# Patient Record
Sex: FEMALE | Race: WHITE | Employment: OTHER | URBAN - METROPOLITAN AREA
[De-identification: names, ages, dates, MRNs, and addresses within clinical notes are randomized per-mention and may not be internally consistent; named-entity substitution may affect disease eponyms.]

---

## 2018-01-22 ENCOUNTER — OFFICE VISIT (OUTPATIENT)
Dept: ORTHOPEDIC SURGERY | Age: 58
End: 2018-01-22

## 2018-01-22 VITALS
WEIGHT: 240 LBS | BODY MASS INDEX: 42.52 KG/M2 | HEIGHT: 63 IN | SYSTOLIC BLOOD PRESSURE: 147 MMHG | HEART RATE: 93 BPM | DIASTOLIC BLOOD PRESSURE: 89 MMHG

## 2018-01-22 DIAGNOSIS — M25.521 RIGHT ELBOW PAIN: ICD-10-CM

## 2018-01-22 DIAGNOSIS — M25.511 RIGHT SHOULDER PAIN, UNSPECIFIED CHRONICITY: Primary | ICD-10-CM

## 2018-01-22 PROCEDURE — 99204 OFFICE O/P NEW MOD 45 MIN: CPT | Performed by: ORTHOPAEDIC SURGERY

## 2018-01-22 ASSESSMENT — ENCOUNTER SYMPTOMS
SHORTNESS OF BREATH: 1
BLOOD IN STOOL: 1
BACK PAIN: 1

## 2018-01-22 NOTE — PROGRESS NOTES
space.    Moderate purchase patient has a rotator cuff tear. She's had inadequate CT scans up to now to evaluate this. She cannot have an MRI because of the spinal cord stimulator. We will obtain a CT arthrogram of her shoulder to evaluate her rotator cuff and she'll be seen after completion of study. Greater than 50% of the visit was spent counseling the patient. I personally reviewed the patient's pain scale, review of systems, family history, social history, past medical history, allergies and medications. 13 point review of systems was collected today and is included in the medical record. Vj Espana MD  Sports Medicine, Knee and Shoulder Surgery    This dictation was performed with a verbal recognition program [facility]) and it was checked for errors. It is possible that there are still dictated errors within this office note. If so, please bring any errors to my attention for an addendum. All efforts were made to ensure that this office note is accurate.

## 2018-01-29 ENCOUNTER — OFFICE VISIT (OUTPATIENT)
Dept: ORTHOPEDIC SURGERY | Age: 58
End: 2018-01-29

## 2018-01-29 VITALS
SYSTOLIC BLOOD PRESSURE: 130 MMHG | DIASTOLIC BLOOD PRESSURE: 85 MMHG | BODY MASS INDEX: 42.52 KG/M2 | HEART RATE: 85 BPM | HEIGHT: 63 IN | WEIGHT: 240 LBS

## 2018-01-29 DIAGNOSIS — S46.811A PARTIAL TEAR OF RIGHT SUBSCAPULARIS TENDON, INITIAL ENCOUNTER: Primary | ICD-10-CM

## 2018-01-29 DIAGNOSIS — M19.019 AC (ACROMIOCLAVICULAR) JOINT ARTHRITIS: ICD-10-CM

## 2018-01-29 PROCEDURE — 1036F TOBACCO NON-USER: CPT | Performed by: ORTHOPAEDIC SURGERY

## 2018-01-29 PROCEDURE — G8428 CUR MEDS NOT DOCUMENT: HCPCS | Performed by: ORTHOPAEDIC SURGERY

## 2018-01-29 PROCEDURE — 3014F SCREEN MAMMO DOC REV: CPT | Performed by: ORTHOPAEDIC SURGERY

## 2018-01-29 PROCEDURE — 3017F COLORECTAL CA SCREEN DOC REV: CPT | Performed by: ORTHOPAEDIC SURGERY

## 2018-01-29 PROCEDURE — G8417 CALC BMI ABV UP PARAM F/U: HCPCS | Performed by: ORTHOPAEDIC SURGERY

## 2018-01-29 PROCEDURE — 99214 OFFICE O/P EST MOD 30 MIN: CPT | Performed by: ORTHOPAEDIC SURGERY

## 2018-01-29 PROCEDURE — G8484 FLU IMMUNIZE NO ADMIN: HCPCS | Performed by: ORTHOPAEDIC SURGERY

## 2018-01-29 ASSESSMENT — ENCOUNTER SYMPTOMS
SHORTNESS OF BREATH: 1
BACK PAIN: 1
BLOOD IN STOOL: 1

## 2018-01-29 NOTE — PROGRESS NOTES
Review of Systems   Respiratory: Positive for shortness of breath. Asthma   Cardiovascular:        Hypertension   Gastrointestinal: Positive for blood in stool. Ulcer     Genitourinary:        Incontinence   Musculoskeletal: Positive for back pain, joint pain and neck pain.         R arm pain    Neurological:        Neuropathy  Fibromyalgia  Chronic pain    Psychiatric/Behavioral:        Anxiety

## 2018-01-29 NOTE — PROGRESS NOTES
Chief complaint is right shoulder pain. Patient comes in following her CT arthrogram which showed she had a significant partial-thickness subscapularis tear. No evidence of communicating super status or infraspinatus tear was present. She continues to have pain which is present primarily of the anterior aspect of her shoulder and worse with lifting and with using her arm out to the side. Pain Assessment  Location of Pain: Shoulder  Location Modifiers: Right  Severity of Pain: 8  Quality of Pain: Sharp, Aching  Duration of Pain: Persistent  Frequency of Pain: Constant  Aggravating Factors: Bending (reaching / lifting )  Limiting Behavior: Yes  Relieving Factors: Rest  Result of Injury: No  Work-Related Injury: No  Are there other pain locations you wish to document?: No    Past Medical History:   Diagnosis Date    Anxiety     Arthritis     knees, shoulders    Asthma     Bladder spasms     has spinal cord stimulator    COPD (chronic obstructive pulmonary disease) (HCC)     Depression     Diabetes mellitus (HCC)     Fibromyalgia     Hyperlipidemia     Hypertension     Migraine     MVP (mitral valve prolapse)     RSD (reflex sympathetic dystrophy)     Sleep apnea     bipap 17/13  is in car        Past Surgical History:   Procedure Laterality Date    ANKLE ARTHROSCOPY  1993 &1994    left    ANKLE SURGERY  1994    reconstruction and ligament repair    APPENDECTOMY  1987    BLADDER SURGERY  2005    collegen injection    BLADDER SUSPENSION  2003    TVT    CARDIAC CATHETERIZATION  2005    no blockage    CARPAL TUNNEL RELEASE  1988    right    CHOLECYSTECTOMY  1996    w/follow up for necrotic tissue 1 wk later    FRACTURE SURGERY  1967    left forearm   [place].  w/abd. adhesions repair    HYSTERECTOMY  1992    w/right ooperectomy    JOINT REPLACEMENT  2012 knee right    KNEE ARTHROPLASTY  06/12/2012    RIGHT TOTAL KNEE REPLACEMENT              KNEE ARTHROSCOPY  2000 (MS CONTIN) 15 MG CR tablet Take 1 tablet by mouth daily DONT FILL UNTIL 9/8 30 tablet 0    baclofen (LIORESAL) 10 MG tablet Take 1 tablet by mouth 2 times daily 60 tablet 5    morphine (MSIR) 15 MG tablet Take 1 tablet by mouth daily 30 tablet 0    morphine (MSIR) 15 MG tablet Take 1 tablet by mouth daily Fill after 1/1/16 30 tablet 0    oxyCODONE-acetaminophen (PERCOCET) 5-325 MG per tablet Take 1 tablet by mouth 3 times daily Fill after 1/1/16 90 tablet 0    oxyCODONE-acetaminophen (PERCOCET) 5-325 MG per tablet Take 1 tablet by mouth 3 times daily 90 tablet 0    oxyCODONE-acetaminophen (PERCOCET) 5-325 MG per tablet Take 1 tablet by mouth 3 times daily as needed for Pain. 90 tablet 0    oxyCODONE-acetaminophen (PERCOCET) 7.5-325 MG per tablet Take 2 tablets by mouth every 4 hours as needed for Pain (May take one or two tablets as needed for pain) for 7 days. 60 tablet 0    aspirin (TOYIN ASPIRIN) 325 MG tablet Take 1 tablet by mouth 2 times daily for 30 days. Take the aspirin twice per day with meals. The medication may be stopped after four weeks. 100 tablet 0    SitaGLIPtin-MetFORMIN HCl (JANUMET XR)  MG TB24 Take 1 tablet by mouth daily.  oxyCODONE-acetaminophen (PERCOCET)  MG per tablet Take 60 tablets by mouth every 4 hours as needed for Pain (may take 1/2 or one tablet as needed for pain.) for 7 days. 60 tablet 0    Mometasone Furoate (NASONEX NA) 2 sprays by Nasal route 2 times daily. 2 sprays each nare bid       Roflumilast (DALIRESP) 500 MCG tablet Take 500 mcg by mouth daily.  azelastine (ASTELIN) 137 MCG/SPRAY nasal spray 2 sprays by Nasal route 2 times daily. 2 sprays each nare bid       budesonide-formoterol (SYMBICORT) 160-4.5 MCG/ACT AERO Inhale 2 puffs into the lungs 2 times daily.  Levalbuterol HCl (XOPENEX IN) Inhale 2 puffs into the lungs 2 times daily.  cetirizine (ZYRTEC) 10 MG tablet Take 10 mg by mouth daily as needed.       simvastatin

## 2018-02-06 ENCOUNTER — EVALUATION (OUTPATIENT)
Dept: PHYSICAL THERAPY | Age: 58
End: 2018-02-06

## 2018-02-06 DIAGNOSIS — M25.511 ACUTE PAIN OF RIGHT SHOULDER: Primary | ICD-10-CM

## 2018-02-06 DIAGNOSIS — M75.111 INCOMPLETE TEAR OF RIGHT ROTATOR CUFF: ICD-10-CM

## 2018-02-06 PROCEDURE — 1101F PT FALLS ASSESS-DOCD LE1/YR: CPT | Performed by: PHYSICAL THERAPIST

## 2018-02-06 PROCEDURE — 97530 THERAPEUTIC ACTIVITIES: CPT | Performed by: PHYSICAL THERAPIST

## 2018-02-06 PROCEDURE — G8539 DOC FUNCT AND CARE PLAN: HCPCS | Performed by: PHYSICAL THERAPIST

## 2018-02-06 PROCEDURE — G8427 DOCREV CUR MEDS BY ELIG CLIN: HCPCS | Performed by: PHYSICAL THERAPIST

## 2018-02-06 PROCEDURE — G8730 PAIN DOC POS AND PLAN: HCPCS | Performed by: PHYSICAL THERAPIST

## 2018-02-06 PROCEDURE — 97110 THERAPEUTIC EXERCISES: CPT | Performed by: PHYSICAL THERAPIST

## 2018-02-06 PROCEDURE — G8984 CARRY CURRENT STATUS: HCPCS | Performed by: PHYSICAL THERAPIST

## 2018-02-06 PROCEDURE — 97162 PT EVAL MOD COMPLEX 30 MIN: CPT | Performed by: PHYSICAL THERAPIST

## 2018-02-06 PROCEDURE — G8985 CARRY GOAL STATUS: HCPCS | Performed by: PHYSICAL THERAPIST

## 2018-02-06 NOTE — FLOWSHEET NOTE
[facility]   Phone: [phone]    Fax: [phone]        Physical Therapy Daily Treatment Note  Date:  2018    Patient Name:  Yvonne De La Torre    :  1960  MRN: [id number]  Medical/Treatment Diagnosis Information:  Diagnosis: Right shoulder partial subscapularis tear     Insurance/Certification information:  PT Insurance Information: Medicare  Physician Information:  Referring Practitioner: Dr Suman Chappell of care signed (Y/N):     Date of Patient follow up with Physician: 18    G-Code (if applicable):      Date G-Code Applied:  18  PT G-Codes  Functional Assessment Tool Used: UEFI  Score: 32/80=40%   (60% FUNCTIONAL LIMITATION)  Functional Limitation: Carrying, moving and handling objects  Carrying, Moving and Handling Objects Current Status (): At least 60 percent but less than 80 percent impaired, limited or restricted  Carrying, Moving and Handling Objects Goal Status (): At least 1 percent but less than 20 percent impaired, limited or restricted    PQRS:  18  Reviewed medication list with patient. No changes since last PT visit.         Progress Note: [x]  Yes  []  No  Next due by: Visit #10      Latex Allergy:  []NO      [x]YES  Preferred Language for Healthcare:   [x]English       []other:    Visit # Insurance Allowable   1 20     Pain level:  0-6/10     SUBJECTIVE:  See eval    OBJECTIVE:     ROM PROM AROM  Comment    L R L R 18   Flexion   160° 130° seated   Abduction   160° 135° seated   ER @90   90° 85° supine   IR @ 90   60° 55° supine   Other        Other             Strength L R Comment   Flexion 4+/5 3+/5* 18   Abduction 4+/5 3+/5*    ER 4+/5 4/5    IR 5/5 4/5*    Supraspinatus      Upper Trap      Lower Trap      Mid Trap      Rhomboids      Biceps      Triceps      Horizontal Abduction      Horizontal Adduction      Lats          Special Tests  18 Results/Comment   Darnell Bhatti    Speeds (+)

## 2018-02-06 NOTE — PLAN OF CARE
[facility]   Phone: [phone]    Fax: [phone]        Physical Therapy Certification    Dear Referring Practitioner: Dr Viet Garcia,    We had the pleasure of evaluating the following patient for physical therapy services at [address] [place] [address]. A summary of our findings can be found in the initial assessment below. This includes our plan of care. If you have any questions or concerns regarding these findings, please do not hesitate to contact me at the office phone number checked above. Thank you for the referral.       Physician Signature:_______________________________Date:__________________  By signing above (or electronic signature), therapists plan is approved by physician      Patient: Jaycee Avila   : 1960   MRN: [id number]  Referring Physician: Referring Practitioner: Dr Viet Garcia      Evaluation Date: 2018      Medical Diagnosis Information:  Diagnosis: Right shoulder partial subscapularis tear                                             Insurance information: PT Insurance Information: Medicare    Precautions/ Contra-indications: Has a spinal stimulater  Latex Allergy:  []NO      [x]YES  Preferred Language for Healthcare:   [x]English       []other:    SUBJECTIVE:   She reports that she felt like she fully recoverd from her previous RTC repairs in both shoulders. In 2017, she was on vacation, repeatedly pulling herself up into a tall truck with her right arm. Major increase in anterior shoulder pain and it feels like it is a \"tight band\" around her upper arm. She originally saw an MD from [facility]. Received a cortisone shot and was given some exercises to do at home. Reported no relief with the injection and the exercises seemed to make it worse. She is already taking Advil. She got in to see Dr Viet Garcia in January. He ordered a CT arthrogram which showed a partial thickness tear of her subscap.    Wants OH/ER  Left:   T4                   Right:  T2*  BB/IR   Left:   T8                   Right:  L1-2*       Reflexes/Sensation:    [x]Dermatomes/Myotomes intact    []Reflexes equal and normal bilaterally   []Other:    Joint mobility:    []Normal    [x]Hypo   []Hyper    Palpation: marked TTP LHBT    Functional Mobility/Transfers: independent with basic transfers    Posture:  moderate forward shoulder/head    Bandages/Dressings/Incisions: NA    Gait: (include devices/WB status): WNL    Orthopedic Special Tests: see above                       [x] Patient history, allergies, meds reviewed. Medical chart reviewed. See intake form. Review Of Systems (ROS):  [x]Performed Review of systems (Integumentary, CardioPulmonary, Neurological) by intake and observation. Intake form has been scanned into medical record. Patient has been instructed to contact their primary care physician regarding ROS issues if not already being addressed at this time.       Co-morbidities/Complexities (which will affect course of rehabilitation):   []None           Arthritic conditions   []Rheumatoid arthritis (M05.9)  [x]Osteoarthritis (M19.91)   Cardiovascular conditions   [x]Hypertension (I10)  []Hyperlipidemia (E78.5)  []Angina pectoris (I20)  []Atherosclerosis (I70)   Musculoskeletal conditions   []Disc pathology   []Congenital spine pathologies   [x]Prior surgical intervention  []Osteoporosis (M81.8)  []Osteopenia (M85.8)   Endocrine conditions   []Hypothyroid (E03.9)  []Hyperthyroid Gastrointestinal conditions   []Constipation (C61.59)   Metabolic conditions   []Morbid obesity (E66.01)  [x]Diabetes type 1(E10.65) or 2 (E11.65)   [x]Neuropathy (G60.9)     Pulmonary conditions   []Asthma (J45)  []Coughing   []COPD (J44.9)   Psychological Disorders  [x]Anxiety (F41.9)  []Depression (F32.9)   []Other:   []Other:          Barriers to/and or personal factors that will affect rehab potential:              []Age  []Sex []Motivation/Lack of Motivation                        [x]Co-Morbidities              []Cognitive Function, education/learning barriers              []Environmental, home barriers              []profession/work barriers  [x]past PT/medical experience  []other:  Justification: Significant medical history and co morbidities     Falls Risk Assessment (30 days):   [x] Falls Risk assessed and no intervention required. [] Falls Risk assessed and Patient requires intervention due to being higher risk   TUG score (>12s at risk):     [] Falls education provided, including       G-Codes:  PT G-Codes  Functional Assessment Tool Used: UEFI  Score: 32/80=40%   (60% FUNCTIONAL LIMITATION)  Functional Limitation: Carrying, moving and handling objects  Carrying, Moving and Handling Objects Current Status (): At least 60 percent but less than 80 percent impaired, limited or restricted  Carrying, Moving and Handling Objects Goal Status (): At least 1 percent but less than 20 percent impaired, limited or restricted        Pain  [x]  Pain diagram was completed, pain was present and a follow up plan to address the pain is in the plan of care. ()   []  Pain diagram was completed and there was no pain present and therefore no follow up plan to address pain in the plan of care. ()   []  Pain diagram was not completed and the reason for not completing the pain diagram is in the medical chart ()  []  Patient refused to participate   []  Severe mental or physical capacity, patient is in urgent emergent situation and to complete the questionnaire would jeopardize the patient's health status        Functional Questionnaire  [x]  Patient completed the functional outcome questionnaire and deficiencies were addressed in the plan of care. ()   []  Patient completed the outcome questionnaire and there were no functional deficits identified and therefore no plan of care is required.  ()   []  Patient did not Term Goals: To be achieved in: 4 weeks  1. Disability index score of 30% or less for the DASH to assist with reaching prior level of function. 2. Patient will demonstrate increased AROM to 90% of uninvolved to allow for proper joint functioning as indicated by patients Functional Deficits. 3. Patient will demonstrate an increase in Strength by 1/2 grade throughout to allow for proper functional mobility as indicated by patients Functional Deficits. 4. Patient will return to prior basic/low level functional activities without increased symptoms or restriction.    5. Able to sleep and drive comfortably without compensation     Electronically signed by:      Chichi Teague, [address] #[id number]

## 2018-02-13 ENCOUNTER — TREATMENT (OUTPATIENT)
Dept: PHYSICAL THERAPY | Age: 58
End: 2018-02-13

## 2018-02-13 DIAGNOSIS — M25.511 ACUTE PAIN OF RIGHT SHOULDER: Primary | ICD-10-CM

## 2018-02-13 DIAGNOSIS — M75.111 INCOMPLETE TEAR OF RIGHT ROTATOR CUFF: ICD-10-CM

## 2018-02-13 PROCEDURE — 97110 THERAPEUTIC EXERCISES: CPT | Performed by: PHYSICAL THERAPIST

## 2018-02-13 PROCEDURE — G8427 DOCREV CUR MEDS BY ELIG CLIN: HCPCS | Performed by: PHYSICAL THERAPIST

## 2018-02-13 PROCEDURE — 97530 THERAPEUTIC ACTIVITIES: CPT | Performed by: PHYSICAL THERAPIST

## 2018-02-13 NOTE — FLOWSHEET NOTE
[facility]   Phone: [phone]    Fax: [phone]        Physical Therapy Daily Treatment Note  Date:  2018    Patient Name:  Trixie Coleman    :  1960  MRN: [id number]  Medical/Treatment Diagnosis Information:  Diagnosis: Right shoulder partial subscapularis tear     Insurance/Certification information:  PT Insurance Information: Medicare  Physician Information:  Referring Practitioner: Dr Sukhi Stevens of care signed (Y/N):     Date of Patient follow up with Physician: 18    G-Code (if applicable):      Date G-Code Applied:  18  PT G-Codes  Functional Assessment Tool Used: UEFI  Score: 32/80=40%   (60% FUNCTIONAL LIMITATION)  Functional Limitation: Carrying, moving and handling objects  Carrying, Moving and Handling Objects Current Status (): At least 60 percent but less than 80 percent impaired, limited or restricted  Carrying, Moving and Handling Objects Goal Status (): At least 1 percent but less than 20 percent impaired, limited or restricted    PQRS:  18  Reviewed medication list with patient. No changes since last PT visit. Progress Note: []  Yes  []  No  Next due by: Visit #10      Latex Allergy:  []NO      [x]YES  Preferred Language for Healthcare:   [x]English       []other:    Visit # Insurance Allowable   2 20     Pain level:  6/10   2/13/18     SUBJECTIVE:    She reports that her shoulder has been fairly sore over the past few days. Has been doing her HEP and also found her old pulley set at home.            OBJECTIVE:     ROM PROM AROM  Comment    L R L R 18   Flexion   160° 130° seated   Abduction   160° 135° seated   ER @90   90° 85° supine   IR @ 90   60° 55° supine   Other        Other             Strength L R Comment   Flexion 4+/5 3+/5* 18   Abduction 4+/5 3+/5*    ER 4+/5 4/5    IR 5/5 4/5*    Supraspinatus      Upper Trap      Lower Trap      Mid Trap      Rhomboids      Biceps      Triceps Horizontal Abduction      Horizontal Adduction      Lats          Special Tests  2/6/18 Results/Comment   RoxannaShellyBjorn Bhatti    Speeds (+)   O[place]riens    Belly Press Able to hold by (+) for pain   Neurologic Signs    Functional Reach OH/ER  Left:   T4                   Right:  T2*  BB/IR   Left:   T8                   Right:  L1-2*           RESTRICTIONS/PRECAUTIONS:  She has a spinal stimulater    Exercises/Interventions:     Exercise/Equipment Resistance/Repetitions Comments   Cardio/Warm-up     Bike          Stretching/PROM     Wand Supine flexion 5\"x10  Standing abduction 5\"x10    Wall Slides Flexion 5\"x10  npv   UE [place] Standing flexion 5\"x15 [place] low on incline   Pulleys BTD x5'    Pendulum           STRENGTH     Isometrics     Retraction          Weight shift     Flexion     Abduction     External Rotation Towel into wall 5\"x15 submax   Internal Rotation Towel into wall 5\"x15 submax   Biceps     Triceps          PRE's     Flexion     Abduction     External Rotation     Internal Rotation     Shrugs     EXT     Reverse Flys     Serratus     Horizontal Abd with ER     Biceps     Triceps     Retraction          Cable Column/Theraband     External Rotation     Internal Rotation     Shrugs     Lats     Ext     Flex     Scapular Retraction     BIC     TRIC     PNF          Neuromuscular Re-ed     Dynamic Stability Supine/Inclined AROM Flex/Ext,  CW/CCW x30                             [place]                    Manual/Modalities                           Therapeutic Exercise and NMR EXR  [x] (71864) Provided verbal/tactile cueing for activities related to strengthening, flexibility, endurance, ROM  for improvements in scapular, scapulothoracic and UE control with self care, reaching, carrying, lifting, house/yardwork, driving/computer work.    [] (71468) Provided verbal/tactile cueing for activities related to improving balance, coordination, kinesthetic sense, posture, motor skill, proprioception

## 2018-02-21 ENCOUNTER — TREATMENT (OUTPATIENT)
Dept: PHYSICAL THERAPY | Age: 58
End: 2018-02-21

## 2018-02-21 DIAGNOSIS — M75.111 INCOMPLETE TEAR OF RIGHT ROTATOR CUFF: ICD-10-CM

## 2018-02-21 DIAGNOSIS — M25.511 ACUTE PAIN OF RIGHT SHOULDER: Primary | ICD-10-CM

## 2018-02-21 PROCEDURE — 97110 THERAPEUTIC EXERCISES: CPT | Performed by: PHYSICAL THERAPIST

## 2018-02-21 PROCEDURE — G8427 DOCREV CUR MEDS BY ELIG CLIN: HCPCS | Performed by: PHYSICAL THERAPIST

## 2018-02-21 PROCEDURE — 97530 THERAPEUTIC ACTIVITIES: CPT | Performed by: PHYSICAL THERAPIST

## 2018-02-21 NOTE — FLOWSHEET NOTE
balance, coordination, kinesthetic sense, posture, motor skill, proprioception  to assist with  scapular, scapulothoracic and UE control with self care, reaching, carrying, lifting, house/yardwork, driving/computer work. Therapeutic Activities:    [x] (40750 or 31735) Provided verbal/tactile cueing for activities related to improving balance, coordination, kinesthetic sense, posture, motor skill, proprioception and motor activation to allow for proper function of scapular, scapulothoracic and UE control with self care, carrying, lifting, driving/computer work. 2/6/18  Spent time discussing with patient here precautions and limitations. Needs to avoid any heavy lifting, but ok to use her arm within comfortable ranges. Also talked about getting a set of home pulleys. She said she may still have some from her previous surgeries. I also provided her information on where she can buy a set online.     Home Exercise Program:    [x] (64808) Reviewed/Progressed HEP activities related to strengthening, flexibility, endurance, ROM of scapular, scapulothoracic and UE control with self care, reaching, carrying, lifting, house/yardwork, driving/computer work  [] (06189) Reviewed/Progressed HEP activities related to improving balance, coordination, kinesthetic sense, posture, motor skill, proprioception of scapular, scapulothoracic and UE control with self care, reaching, carrying, lifting, house/yardwork, driving/computer work      Manual Treatments:  PROM / STM / Oscillations-Mobs:  G-I, II, III, IV (PA's, Inf., Post.)  [] (53362) Provided manual therapy to mobilize soft tissue/joints of cervical/CT, scapular GHJ and UE for the purpose of modulating pain, promoting relaxation,  increasing ROM, reducing/eliminating soft tissue swelling/inflammation/restriction, improving soft tissue extensibility and allowing for proper ROM for normal function with self care, reaching, carrying, lifting, house/yardwork, weaker than IR. She is also maintaining good PROM. Working on increasing control with her AAROM and AROM especially in positions of 90 and overhead. She demonstrates good control with UE at 90 in gravity eliminated positions (supine).       Treatment/Activity Tolerance:  [x] Patient tolerated treatment well with some increase in pain [] Patient limited by fatique  [x] Patient limited by pain  [] Patient limited by other medical complications  [] Other:     Prognosis: [] Good [x] Fair with conservative treatment [] Poor    Patient Requires Follow-up: [x] Yes  [] No    PLAN: See eval.  Cont 1-2 x/week for 3-4 weeks for ROM restoration, strengthening within painfree ranges  [x] Continue per plan of care [] Alter current plan (see comments)  [] Plan of care initiated [] Hold pending MD visit [] Discharge    Electronically signed by:     Zainab Cabral PT #[id number]

## 2018-02-26 ENCOUNTER — OFFICE VISIT (OUTPATIENT)
Dept: ORTHOPEDIC SURGERY | Age: 58
End: 2018-02-26

## 2018-02-26 VITALS
WEIGHT: 237 LBS | HEIGHT: 63 IN | SYSTOLIC BLOOD PRESSURE: 141 MMHG | HEART RATE: 95 BPM | BODY MASS INDEX: 41.99 KG/M2 | DIASTOLIC BLOOD PRESSURE: 86 MMHG

## 2018-02-26 DIAGNOSIS — M75.101 TEAR OF RIGHT ROTATOR CUFF, UNSPECIFIED TEAR EXTENT: Primary | ICD-10-CM

## 2018-02-26 DIAGNOSIS — M25.511 RIGHT SHOULDER PAIN, UNSPECIFIED CHRONICITY: ICD-10-CM

## 2018-02-26 PROCEDURE — 99214 OFFICE O/P EST MOD 30 MIN: CPT | Performed by: ORTHOPAEDIC SURGERY

## 2018-02-26 PROCEDURE — 3017F COLORECTAL CA SCREEN DOC REV: CPT | Performed by: ORTHOPAEDIC SURGERY

## 2018-02-26 PROCEDURE — 3014F SCREEN MAMMO DOC REV: CPT | Performed by: ORTHOPAEDIC SURGERY

## 2018-02-26 PROCEDURE — 1036F TOBACCO NON-USER: CPT | Performed by: ORTHOPAEDIC SURGERY

## 2018-02-26 PROCEDURE — G8417 CALC BMI ABV UP PARAM F/U: HCPCS | Performed by: ORTHOPAEDIC SURGERY

## 2018-02-26 PROCEDURE — G8484 FLU IMMUNIZE NO ADMIN: HCPCS | Performed by: ORTHOPAEDIC SURGERY

## 2018-02-26 PROCEDURE — 20610 DRAIN/INJ JOINT/BURSA W/O US: CPT | Performed by: ORTHOPAEDIC SURGERY

## 2018-02-26 PROCEDURE — G8427 DOCREV CUR MEDS BY ELIG CLIN: HCPCS | Performed by: ORTHOPAEDIC SURGERY

## 2018-02-26 ASSESSMENT — ENCOUNTER SYMPTOMS
SHORTNESS OF BREATH: 1
BLOOD IN STOOL: 1
BACK PAIN: 1

## 2018-02-26 NOTE — PROGRESS NOTES
Cortisone injection: right shoulder    2cc depo medrol 40mg    Lot: [id number]  Exp: 04/2020  Ndc:8849-0075-80    4cc lidocaine 1%hcl    Lot: -[id number]  Exp: 2019-05-01  Ndc: 2433-6047-44

## 2018-02-26 NOTE — PROGRESS NOTES
Patient seen for follow-up of her right shoulder. She had a CT arthrogram done because she can't have an MRI secondary to a spinal stimulator. She reports that the tightness that she had in her arm as well as elbow pain has resolved. She claims to have significant discomfort over the anterolateral aspect of her arm which exacerbated by any overhead movements. Difficulty with any lifting. Can't lie on that side. Pain is somewhat relieved with rest.  No new injuries. Pain Assessment  Location of Pain: Shoulder  Location Modifiers: Right  Severity of Pain: 6  Quality of Pain: Sharp (deep)  Duration of Pain: Persistent  Frequency of Pain: Constant  Aggravating Factors:  (ADLs, lifting objects >5#, pushing items)  Limiting Behavior: Yes  Relieving Factors: Rest, Ice, Nsaids  Result of Injury: No  Work-Related Injury: No  Are there other pain locations you wish to document?: No    Past Medical History:   Diagnosis Date    Anxiety     Arthritis     knees, shoulders    Asthma     Bladder spasms     has spinal cord stimulator    COPD (chronic obstructive pulmonary disease) (HCC)     Depression     Diabetes mellitus (HCC)     Fibromyalgia     Hyperlipidemia     Hypertension     Migraine     MVP (mitral valve prolapse)     RSD (reflex sympathetic dystrophy)     Sleep apnea     bipap 17/13  is in car        Past Surgical History:   Procedure Laterality Date    ANKLE ARTHROSCOPY  1993 &1994    left    ANKLE SURGERY  1994    reconstruction and ligament repair    APPENDECTOMY  1987    BLADDER SURGERY  2005    collegen injection    BLADDER SUSPENSION  2003    TVT    CARDIAC CATHETERIZATION  2005    no blockage    CARPAL TUNNEL RELEASE  1988    right    CHOLECYSTECTOMY  1996    w/follow up for necrotic tissue 1 wk later    FRACTURE SURGERY  1967    left forearm   [place].  w/abd. adhesions repair    HYSTERECTOMY  1992    w/right ooperectomy    JOINT REPLACEMENT  2012 knee right

## 2018-03-01 ENCOUNTER — TREATMENT (OUTPATIENT)
Dept: PHYSICAL THERAPY | Age: 58
End: 2018-03-01

## 2018-03-01 DIAGNOSIS — M25.511 ACUTE PAIN OF RIGHT SHOULDER: Primary | ICD-10-CM

## 2018-03-01 DIAGNOSIS — M75.111 INCOMPLETE TEAR OF RIGHT ROTATOR CUFF: ICD-10-CM

## 2018-03-01 PROCEDURE — 97112 NEUROMUSCULAR REEDUCATION: CPT | Performed by: PHYSICAL THERAPIST

## 2018-03-01 PROCEDURE — G8427 DOCREV CUR MEDS BY ELIG CLIN: HCPCS | Performed by: PHYSICAL THERAPIST

## 2018-03-01 PROCEDURE — 97530 THERAPEUTIC ACTIVITIES: CPT | Performed by: PHYSICAL THERAPIST

## 2018-03-01 PROCEDURE — 97110 THERAPEUTIC EXERCISES: CPT | Performed by: PHYSICAL THERAPIST

## 2018-03-01 NOTE — FLOWSHEET NOTE
cueing for activities related to strengthening, flexibility, endurance, ROM  for improvements in scapular, scapulothoracic and UE control with self care, reaching, carrying, lifting, house/yardwork, driving/computer work. [x] (62772) Provided verbal/tactile cueing for activities related to improving balance, coordination, kinesthetic sense, posture, motor skill, proprioception  to assist with  scapular, scapulothoracic and UE control with self care, reaching, carrying, lifting, house/yardwork, driving/computer work. Therapeutic Activities:    [x] (45203 or 93325) Provided verbal/tactile cueing for activities related to improving balance, coordination, kinesthetic sense, posture, motor skill, proprioception and motor activation to allow for proper function of scapular, scapulothoracic and UE control with self care, carrying, lifting, driving/computer work. 2/6/18  Spent time discussing with patient here precautions and limitations. Needs to avoid any heavy lifting, but ok to use her arm within comfortable ranges. Also talked about getting a set of home pulleys. She said she may still have some from her previous surgeries. I also provided her information on where she can buy a set online.     Home Exercise Program:    [x] (40363) Reviewed/Progressed HEP activities related to strengthening, flexibility, endurance, ROM of scapular, scapulothoracic and UE control with self care, reaching, carrying, lifting, house/yardwork, driving/computer work  [] (27411) Reviewed/Progressed HEP activities related to improving balance, coordination, kinesthetic sense, posture, motor skill, proprioception of scapular, scapulothoracic and UE control with self care, reaching, carrying, lifting, house/yardwork, driving/computer work      Manual Treatments:  PROM / STM / Oscillations-Mobs:  G-I, II, III, IV (PA's, Inf., Post.)  [] (54947) Provided manual therapy to mobilize soft tissue/joints of cervical/CT, scapular GHJ and UE for the purpose of modulating pain, promoting relaxation,  increasing ROM, reducing/eliminating soft tissue swelling/inflammation/restriction, improving soft tissue extensibility and allowing for proper ROM for normal function with self care, reaching, carrying, lifting, house/yardwork, driving/computer work    Modalities:  Ice after [employer]'    Charges:  Timed Code Treatment Minutes: 50   Total Treatment Minutes: 65     [] EVAL (LOW) 69674 (typically 20 minutes face-to-face)  [] EVAL (MOD) 25378 (typically 30 minutes face-to-face)  [] EVAL (HIGH) 66461 (typically 45 minutes face-to-face)  [] RE-EVAL   [x] LM(11579) x  1   [] IONTO  [x] NMR (87375) x  1   [] VASO  [] Manual (37969) x       [] Other:  [x] TA (29141) x  1    [] Mech Traction (24180)  [] ES(attended) (04362)      [] ES (un) (78719):     GOALS:  Patient stated goal: return to prior ADLs without increased pain. Able to sleep at night     Therapist goals for Patient:   Short Term Goals: To be achieved in: 2 weeks  1. Independent in HEP and progression per patient tolerance, in order to prevent re-injury. 2. Patient will have a decrease in pain to facilitate improvement in movement, function, and ADLs as indicated by Functional Deficits.     Long Term Goals: To be achieved in: 4 weeks  1. Disability index score of 30% or less for the DASH to assist with reaching prior level of function. 2. Patient will demonstrate increased AROM to 90% of uninvolved to allow for proper joint functioning as indicated by patients Functional Deficits. 3. Patient will demonstrate an increase in Strength by 1/2 grade throughout to allow for proper functional mobility as indicated by patients Functional Deficits. 4. Patient will return to prior basic/low level functional activities without increased symptoms or restriction.    5. Able to sleep and drive comfortably without compensation                 Progression Towards Functional goals:  [] Patient is progressing as

## 2018-03-08 ENCOUNTER — TREATMENT (OUTPATIENT)
Dept: PHYSICAL THERAPY | Age: 58
End: 2018-03-08

## 2018-03-08 DIAGNOSIS — M75.111 INCOMPLETE TEAR OF RIGHT ROTATOR CUFF: ICD-10-CM

## 2018-03-08 DIAGNOSIS — M25.511 ACUTE PAIN OF RIGHT SHOULDER: Primary | ICD-10-CM

## 2018-03-08 PROCEDURE — 97530 THERAPEUTIC ACTIVITIES: CPT | Performed by: PHYSICAL THERAPIST

## 2018-03-08 PROCEDURE — 97110 THERAPEUTIC EXERCISES: CPT | Performed by: PHYSICAL THERAPIST

## 2018-03-08 PROCEDURE — 97112 NEUROMUSCULAR REEDUCATION: CPT | Performed by: PHYSICAL THERAPIST

## 2018-03-08 PROCEDURE — G8427 DOCREV CUR MEDS BY ELIG CLIN: HCPCS | Performed by: PHYSICAL THERAPIST

## 2018-03-09 ENCOUNTER — TELEPHONE (OUTPATIENT)
Dept: ORTHOPEDIC SURGERY | Age: 58
End: 2018-03-09

## 2018-03-13 ENCOUNTER — TREATMENT (OUTPATIENT)
Dept: PHYSICAL THERAPY | Age: 58
End: 2018-03-13

## 2018-03-13 DIAGNOSIS — M25.511 ACUTE PAIN OF RIGHT SHOULDER: Primary | ICD-10-CM

## 2018-03-13 DIAGNOSIS — M75.111 INCOMPLETE TEAR OF RIGHT ROTATOR CUFF: ICD-10-CM

## 2018-03-13 PROCEDURE — 97530 THERAPEUTIC ACTIVITIES: CPT | Performed by: PHYSICAL THERAPIST

## 2018-03-13 PROCEDURE — 97110 THERAPEUTIC EXERCISES: CPT | Performed by: PHYSICAL THERAPIST

## 2018-03-13 PROCEDURE — 97112 NEUROMUSCULAR REEDUCATION: CPT | Performed by: PHYSICAL THERAPIST

## 2018-03-13 PROCEDURE — G8427 DOCREV CUR MEDS BY ELIG CLIN: HCPCS | Performed by: PHYSICAL THERAPIST

## 2018-03-13 NOTE — FLOWSHEET NOTE
[facility]   Phone: [phone]    Fax: [phone]        Physical Therapy Daily Treatment Note  Date:  3/13/2018    Patient Name:  Maurice Rogers    :  1960  MRN: [id number]  Medical/Treatment Diagnosis Information:  Diagnosis: Right shoulder partial subscapularis tear     Insurance/Certification information:  PT Insurance Information: Medicare  Physician Information:  Referring Practitioner: Dr Kev Houston of care signed (Y/N):     Date of Patient follow up with Physician:      G-Code (if applicable):      Date G-Code Applied:  18  PT G-Codes  Functional Assessment Tool Used: UEFI  Score: 32/80=40%   (60% FUNCTIONAL LIMITATION)  Functional Limitation: Carrying, moving and handling objects  Carrying, Moving and Handling Objects Current Status (): At least 60 percent but less than 80 percent impaired, limited or restricted  Carrying, Moving and Handling Objects Goal Status (): At least 1 percent but less than 20 percent impaired, limited or restricted    PQRS:  3/13/18  Reviewed medication list with patient. No changes since last PT visit. Progress Note: []  Yes  []  No  Next due by: Visit #10      Latex Allergy:  []NO      [x]YES  Preferred Language for Healthcare:   [x]English       []other:    Visit # Insurance Allowable   6 20     Pain level:  5/10   3/8/18     SUBJECTIVE:    She reports that she did pretty well over the weekend. She stayed away from doing anything too heavy or strenuous. She did experience one episode of sharp pain when she excitedly jumped up in celebration and raised her hands overhead. Felt pain from that movement, but then it dissipated.         OBJECTIVE:     ROM PROM AROM  Comment    L R L R 18   Flexion   160° 130° seated   Abduction   160° 135° seated   ER @90   90° 85° supine   IR @ 90   60° 55° supine   Other        Other             Strength L R Comment   Flexion 4+/5 3+/5* 18   Abduction 4+/5 3+/5*    ER 4+/5 4/5    IR 5/5 4/5*    Supraspinatus      Upper Trap      Lower Trap      Mid Trap      Rhomboids      Biceps      Triceps      Horizontal Abduction      Horizontal Adduction      Lats          Special Tests  2/6/18 Results/Comment   Darnell Bhatti    Speeds (+)   OBriens    Belly Press Able to hold by (+) for pain   Neurologic Signs    Functional Reach OH/ER  Left:   T4                   Right:  T2*  BB/IR   Left:   T8                   Right:  L1-2*           RESTRICTIONS/PRECAUTIONS:  She has a spinal stimulater    Exercises/Interventions:     Exercise/Equipment Resistance/Repetitions Comments   Cardio/Warm-up     Bike          Stretching/PROM     Wand Supine flexion 5\"x10  Standing abduction 5\"x10    Wall Slides Flexion 5\"x10     UE [place] Standing flexion 5\"x15 [place] midway on incline   Pulleys BTD x5'    TP BB stretch  10\"x10 gentle        STRENGTH     Isometrics     Retraction          Weight shift     Flexion     Abduction     External Rotation Towel into wall 5\"x15  Red step outs x15 submax   Internal Rotation Towel into wall 5\"x15  Red step outs x15 submax   Biceps     Triceps          PRE's     Flexion     Abduction     External Rotation     Internal Rotation     Shrugs     EXT     Reverse Flys     Serratus     Horizontal Abd with ER     Biceps     Triceps     Retraction          Cable Column/Theraband     External Rotation     Internal Rotation     Shrugs     Lats     Ext     Flex     Scapular Retraction     BIC     TRIC     PNF          Neuromuscular Re-ed     Dynamic Stability Inclined AROM Flex/Ext,  CW/CCW x30  2# in hand     Gymball on wall up/down, sd/sd, cw/ccw x20 each          Supine R/S IR/ER hold in place in neutral 3x30\"              [place]                    Manual/Modalities                           Therapeutic Exercise and NMR EXR  [x] (53241) Provided verbal/tactile cueing for activities related to strengthening, flexibility, endurance, ROM  for improvements in scapular, scapulothoracic and UE control with self care, reaching, carrying, lifting, house/yardwork, driving/computer work. [x] (18451) Provided verbal/tactile cueing for activities related to improving balance, coordination, kinesthetic sense, posture, motor skill, proprioception  to assist with  scapular, scapulothoracic and UE control with self care, reaching, carrying, lifting, house/yardwork, driving/computer work. Therapeutic Activities:    [x] (53064 or 05169) Provided verbal/tactile cueing for activities related to improving balance, coordination, kinesthetic sense, posture, motor skill, proprioception and motor activation to allow for proper function of scapular, scapulothoracic and UE control with self care, carrying, lifting, driving/computer work. 2/6/18  Spent time discussing with patient here precautions and limitations. Needs to avoid any heavy lifting, but ok to use her arm within comfortable ranges. Also talked about getting a set of home pulleys. She said she may still have some from her previous surgeries. I also provided her information on where she can buy a set online.     Home Exercise Program:    [x] (44920) Reviewed/Progressed HEP activities related to strengthening, flexibility, endurance, ROM of scapular, scapulothoracic and UE control with self care, reaching, carrying, lifting, house/yardwork, driving/computer work  [] (93515) Reviewed/Progressed HEP activities related to improving balance, coordination, kinesthetic sense, posture, motor skill, proprioception of scapular, scapulothoracic and UE control with self care, reaching, carrying, lifting, house/yardwork, driving/computer work      Manual Treatments:  PROM / STM / Oscillations-Mobs:  G-I, II, III, IV (PA's, Inf., Post.)  [] (68713) Provided manual therapy to mobilize soft tissue/joints of cervical/CT, scapular GHJ and UE for the purpose of modulating pain, promoting relaxation,  increasing ROM, reducing/eliminating soft tissue swelling/inflammation/restriction, improving soft tissue extensibility and allowing for proper ROM for normal function with self care, reaching, carrying, lifting, house/yardwork, driving/computer work    Modalities:  Ice after x15'    Charges:  Timed Code Treatment Minutes: 50   Total Treatment Minutes: 65     [] EVAL (LOW) 39164 (typically 20 minutes face-to-face)  [] EVAL (MOD) 01302 (typically 30 minutes face-to-face)  [] EVAL (HIGH) 65516 (typically 45 minutes face-to-face)  [] RE-EVAL   [x] SW(44094) x  1   [] IONTO  [x] NMR (98269) x  1   [] VASO  [] Manual (95047) x       [] Other:  [x] TA (52666) x  1    [] Mech Traction (80185)  [] ES(attended) (48687)      [] ES (un) (50355):     GOALS:  Patient stated goal: return to prior ADLs without increased pain. Able to sleep at night     Therapist goals for Patient:   Short Term Goals: To be achieved in: 2 weeks  1. Independent in HEP and progression per patient tolerance, in order to prevent re-injury. 2. Patient will have a decrease in pain to facilitate improvement in movement, function, and ADLs as indicated by Functional Deficits.     Long Term Goals: To be achieved in: 4 weeks  1. Disability index score of 30% or less for the DASH to assist with reaching prior level of function. 2. Patient will demonstrate increased AROM to 90% of uninvolved to allow for proper joint functioning as indicated by patients Functional Deficits. 3. Patient will demonstrate an increase in Strength by 1/2 grade throughout to allow for proper functional mobility as indicated by patients Functional Deficits. 4. Patient will return to prior basic/low level functional activities without increased symptoms or restriction. 5. Able to sleep and drive comfortably without compensation                 Progression Towards Functional goals:  [] Patient is progressing as expected towards functional goals listed.     [] Progression is slowed due to

## 2018-03-20 ENCOUNTER — TREATMENT (OUTPATIENT)
Dept: PHYSICAL THERAPY | Age: 58
End: 2018-03-20

## 2018-03-20 DIAGNOSIS — M75.111 INCOMPLETE TEAR OF RIGHT ROTATOR CUFF: ICD-10-CM

## 2018-03-20 DIAGNOSIS — M25.511 ACUTE PAIN OF RIGHT SHOULDER: Primary | ICD-10-CM

## 2018-03-20 PROCEDURE — 97530 THERAPEUTIC ACTIVITIES: CPT | Performed by: PHYSICAL THERAPIST

## 2018-03-20 PROCEDURE — 97112 NEUROMUSCULAR REEDUCATION: CPT | Performed by: PHYSICAL THERAPIST

## 2018-03-20 PROCEDURE — G8427 DOCREV CUR MEDS BY ELIG CLIN: HCPCS | Performed by: PHYSICAL THERAPIST

## 2018-03-20 PROCEDURE — 97110 THERAPEUTIC EXERCISES: CPT | Performed by: PHYSICAL THERAPIST

## 2018-03-20 NOTE — PLAN OF CARE
conservative treatment [] Poor    Patient Requires Follow-up: [x] Yes  [] No    PLAN: Sees MD on Monday. Follow up after that, anticipate continuing PT 1x/week another 2-4 weeks.      [x] Continue per plan of care [] Alter current plan (see comments)  [] Plan of care initiated [] Hold pending MD visit [] Discharge    Electronically signed by:     Tracee Bates PT #[id number]

## 2018-03-26 ENCOUNTER — OFFICE VISIT (OUTPATIENT)
Dept: ORTHOPEDIC SURGERY | Age: 58
End: 2018-03-26

## 2018-03-26 VITALS
SYSTOLIC BLOOD PRESSURE: 124 MMHG | BODY MASS INDEX: 41.99 KG/M2 | WEIGHT: 236.99 LBS | HEIGHT: 63 IN | HEART RATE: 93 BPM | DIASTOLIC BLOOD PRESSURE: 71 MMHG

## 2018-03-26 DIAGNOSIS — S46.811A PARTIAL TEAR OF RIGHT SUBSCAPULARIS TENDON, INITIAL ENCOUNTER: Primary | ICD-10-CM

## 2018-03-26 PROCEDURE — G8417 CALC BMI ABV UP PARAM F/U: HCPCS | Performed by: ORTHOPAEDIC SURGERY

## 2018-03-26 PROCEDURE — 99213 OFFICE O/P EST LOW 20 MIN: CPT | Performed by: ORTHOPAEDIC SURGERY

## 2018-03-26 PROCEDURE — 3014F SCREEN MAMMO DOC REV: CPT | Performed by: ORTHOPAEDIC SURGERY

## 2018-03-26 PROCEDURE — G8427 DOCREV CUR MEDS BY ELIG CLIN: HCPCS | Performed by: ORTHOPAEDIC SURGERY

## 2018-03-26 PROCEDURE — 1036F TOBACCO NON-USER: CPT | Performed by: ORTHOPAEDIC SURGERY

## 2018-03-26 PROCEDURE — G8484 FLU IMMUNIZE NO ADMIN: HCPCS | Performed by: ORTHOPAEDIC SURGERY

## 2018-03-26 PROCEDURE — 3017F COLORECTAL CA SCREEN DOC REV: CPT | Performed by: ORTHOPAEDIC SURGERY

## 2018-03-26 ASSESSMENT — ENCOUNTER SYMPTOMS
BLOOD IN STOOL: 1
SHORTNESS OF BREATH: 1
BACK PAIN: 1

## 2018-03-26 NOTE — PROGRESS NOTES
Patient comes in the office for a follow up of her right shoulder. She have a partial thickness rotator cuff tear with Impingement and was given a steroid injection  Back in February which she states helped, however she notes that she continues to have pain when she is raising her arms (mainly with an object in hand). And laying on the right side.  She does report that she has resumed her kayla and plans on walking her dogs     - follow up in 1 month
Review of Systems   Respiratory: Positive for shortness of breath. Asthma   Cardiovascular:        Hypertension   Gastrointestinal: Positive for blood in stool. Ulcer   Genitourinary:        Incontinence   Musculoskeletal: Positive for back pain, joint pain and neck pain. Right arm/shoulder pain    Neurological:        Neuropathy  Fibromyalgia  Chronic pain    Psychiatric/Behavioral:        Anxiety   All other systems reviewed and are negative.
by mouth daily 30 tablet 0    morphine sulfate ER (MS CONTIN) 15 MG CR tablet Take 1 tablet by mouth daily DONT FILL UNTIL 9/8 30 tablet 0    baclofen (LIORESAL) 10 MG tablet Take 1 tablet by mouth 2 times daily 60 tablet 5    morphine (MSIR) 15 MG tablet Take 1 tablet by mouth daily 30 tablet 0    morphine (MSIR) 15 MG tablet Take 1 tablet by mouth daily Fill after 1/1/16 30 tablet 0    oxyCODONE-acetaminophen (PERCOCET) 5-325 MG per tablet Take 1 tablet by mouth 3 times daily Fill after 1/1/16 90 tablet 0    oxyCODONE-acetaminophen (PERCOCET) 5-325 MG per tablet Take 1 tablet by mouth 3 times daily 90 tablet 0    oxyCODONE-acetaminophen (PERCOCET) 5-325 MG per tablet Take 1 tablet by mouth 3 times daily as needed for Pain. 90 tablet 0    oxyCODONE-acetaminophen (PERCOCET) 7.5-325 MG per tablet Take 2 tablets by mouth every 4 hours as needed for Pain (May take one or two tablets as needed for pain) for 7 days. 60 tablet 0    aspirin (TOYIN ASPIRIN) 325 MG tablet Take 1 tablet by mouth 2 times daily for 30 days. Take the aspirin twice per day with meals. The medication may be stopped after four weeks. 100 tablet 0    SitaGLIPtin-MetFORMIN HCl (JANUMET XR)  MG TB24 Take 1 tablet by mouth daily.  oxyCODONE-acetaminophen (PERCOCET)  MG per tablet Take 60 tablets by mouth every 4 hours as needed for Pain (may take 1/2 or one tablet as needed for pain.) for 7 days. 60 tablet 0    Mometasone Furoate (NASONEX NA) 2 sprays by Nasal route 2 times daily. 2 sprays each nare bid       Roflumilast (DALIRESP) 500 MCG tablet Take 500 mcg by mouth daily.  azelastine (ASTELIN) 137 MCG/SPRAY nasal spray 2 sprays by Nasal route 2 times daily. 2 sprays each nare bid       budesonide-formoterol (SYMBICORT) 160-4.5 MCG/ACT AERO Inhale 2 puffs into the lungs 2 times daily.  Levalbuterol HCl (XOPENEX IN) Inhale 2 puffs into the lungs 2 times daily.         cetirizine (ZYRTEC) 10 MG tablet Take

## 2018-03-27 ENCOUNTER — TREATMENT (OUTPATIENT)
Dept: PHYSICAL THERAPY | Age: 58
End: 2018-03-27

## 2018-03-27 DIAGNOSIS — M25.511 ACUTE PAIN OF RIGHT SHOULDER: Primary | ICD-10-CM

## 2018-03-27 DIAGNOSIS — M75.111 INCOMPLETE TEAR OF RIGHT ROTATOR CUFF: ICD-10-CM

## 2018-03-27 PROCEDURE — 97530 THERAPEUTIC ACTIVITIES: CPT | Performed by: PHYSICAL THERAPIST

## 2018-03-27 PROCEDURE — 97110 THERAPEUTIC EXERCISES: CPT | Performed by: PHYSICAL THERAPIST

## 2018-03-27 PROCEDURE — G8427 DOCREV CUR MEDS BY ELIG CLIN: HCPCS | Performed by: PHYSICAL THERAPIST

## 2018-03-27 PROCEDURE — 97112 NEUROMUSCULAR REEDUCATION: CPT | Performed by: PHYSICAL THERAPIST

## 2018-03-27 NOTE — FLOWSHEET NOTE
[facility]   Phone: [phone]    Fax: [phone]      Physical Therapy Daily Treatment Note  Date:  3/27/2018    Patient Name:  Ethan Junior    :  1960  MRN: [id number]  Medical/Treatment Diagnosis Information:  Diagnosis: Right shoulder partial subscapularis tear     Insurance/Certification information:  PT Insurance Information: Medicare  Physician Information:  Referring Practitioner: Dr Heide Power of care signed (Y/N):     Date of Patient follow up with Physician:  18    G-Code (if applicable):      Date G-Code Applied:  18  PT G-Codes  Functional Assessment Tool Used: UEFI  Score: 32/80=40%   (60% FUNCTIONAL LIMITATION)  Functional Limitation: Carrying, moving and handling objects  Carrying, Moving and Handling Objects Current Status (): At least 60 percent but less than 80 percent impaired, limited or restricted  Carrying, Moving and Handling Objects Goal Status (): At least 1 percent but less than 20 percent impaired, limited or restricted    PQRS:  3/27/18  Reviewed medication list with patient. No changes since last PT visit. Progress Note: []  Yes  []  No  Next due by: Visit #10      Latex Allergy:  []NO      [x]YES  Preferred Language for Healthcare:   [x]English       []other:    Visit # Insurance Allowable   8 20     Pain level:  2/10   3/20/18     SUBJECTIVE:    Patient saw Dr Thelma Durand yesterday. At which time, they thought she was doing better. Still having pain with certain activities. Her biggest goal is to be able to reach up to get a casserole dish out of the microwave (which is a little higher than her head). And she also wants to be able to hold her hair dryer with her right hand.          OBJECTIVE:     ROM PROM AROM  Comment    L R L R 3/20/18   Flexion   160° 150° seated   Abduction   160° 155° seated   ER @90   90° 85° supine   IR @ 90   60° 55° supine   Other        Other             Strength L verbal/tactile cueing for activities related to strengthening, flexibility, endurance, ROM  for improvements in scapular, scapulothoracic and UE control with self care, reaching, carrying, lifting, house/yardwork, driving/computer work. [x] (69178) Provided verbal/tactile cueing for activities related to improving balance, coordination, kinesthetic sense, posture, motor skill, proprioception  to assist with  scapular, scapulothoracic and UE control with self care, reaching, carrying, lifting, house/yardwork, driving/computer work. Therapeutic Activities:    [x] (59481 or 74007) Provided verbal/tactile cueing for activities related to improving balance, coordination, kinesthetic sense, posture, motor skill, proprioception and motor activation to allow for proper function of scapular, scapulothoracic and UE control with self care, carrying, lifting, driving/computer work. 2/6/18  Spent time discussing with patient here precautions and limitations. Needs to avoid any heavy lifting, but ok to use her arm within comfortable ranges. Also talked about getting a set of home pulleys. She said she may still have some from her previous surgeries. I also provided her information on where she can buy a set online.     Home Exercise Program:    [x] (49319) Reviewed/Progressed HEP activities related to strengthening, flexibility, endurance, ROM of scapular, scapulothoracic and UE control with self care, reaching, carrying, lifting, house/yardwork, driving/computer work  [] (11949) Reviewed/Progressed HEP activities related to improving balance, coordination, kinesthetic sense, posture, motor skill, proprioception of scapular, scapulothoracic and UE control with self care, reaching, carrying, lifting, house/yardwork, driving/computer work      Manual Treatments:  PROM / STM / Oscillations-Mobs:  G-I, II, III, IV (PA's, Inf., Post.)  [] (91764) Provided manual therapy to mobilize soft tissue/joints of cervical/CT,  Goals updated 3/20/18    Progression Towards Functional goals:  [] Patient is progressing as expected towards functional goals listed. [] Progression is slowed due to complexities listed. [] Progression has been slowed due to co-morbidities. [x] Plan just implemented, too soon to assess goals progression  [] Other:     ASSESSMENT:      She continues to do well with her exercises. Requires occasional verbal cues and tactile cues to maintain proper technique and position. Maintaining good AROM of her right shoulder throughout. She was definitely fatigued today after her workout. Treatment/Activity Tolerance:  [x] Patient tolerated treatment well with some increase in pain [x] Patient limited by fatique  [x] Patient limited by pain  [] Patient limited by other medical complications  [] Other:     Prognosis: [] Good [x] Fair with conservative treatment [] Poor    Patient Requires Follow-up: [x] Yes  [] No    PLAN:  continuing PT 1x/week another 2-4 weeks. Working on gaining strength/endurance in her UE at shoulder height and higher.       [x] Continue per plan of care [] Alter current plan (see comments)  [] Plan of care initiated [] Hold pending MD visit [] Discharge    Electronically signed by:     Luiza Capps PT #[id number]

## 2018-04-03 ENCOUNTER — TREATMENT (OUTPATIENT)
Dept: PHYSICAL THERAPY | Age: 58
End: 2018-04-03

## 2018-04-03 DIAGNOSIS — M25.511 ACUTE PAIN OF RIGHT SHOULDER: Primary | ICD-10-CM

## 2018-04-03 DIAGNOSIS — M75.111 INCOMPLETE TEAR OF RIGHT ROTATOR CUFF: ICD-10-CM

## 2018-04-03 PROCEDURE — 97112 NEUROMUSCULAR REEDUCATION: CPT | Performed by: PHYSICAL THERAPIST

## 2018-04-03 PROCEDURE — 97530 THERAPEUTIC ACTIVITIES: CPT | Performed by: PHYSICAL THERAPIST

## 2018-04-03 PROCEDURE — G8427 DOCREV CUR MEDS BY ELIG CLIN: HCPCS | Performed by: PHYSICAL THERAPIST

## 2018-04-03 PROCEDURE — 97110 THERAPEUTIC EXERCISES: CPT | Performed by: PHYSICAL THERAPIST

## 2018-04-10 ENCOUNTER — TREATMENT (OUTPATIENT)
Dept: PHYSICAL THERAPY | Age: 58
End: 2018-04-10

## 2018-04-10 DIAGNOSIS — M25.511 ACUTE PAIN OF RIGHT SHOULDER: Primary | ICD-10-CM

## 2018-04-10 DIAGNOSIS — M75.111 INCOMPLETE TEAR OF RIGHT ROTATOR CUFF: ICD-10-CM

## 2018-04-17 ENCOUNTER — TREATMENT (OUTPATIENT)
Dept: PHYSICAL THERAPY | Age: 58
End: 2018-04-17

## 2018-04-17 DIAGNOSIS — M75.111 INCOMPLETE TEAR OF RIGHT ROTATOR CUFF: ICD-10-CM

## 2018-04-17 DIAGNOSIS — M25.511 ACUTE PAIN OF RIGHT SHOULDER: Primary | ICD-10-CM

## 2018-04-17 PROCEDURE — 97112 NEUROMUSCULAR REEDUCATION: CPT | Performed by: PHYSICAL THERAPIST

## 2018-04-17 PROCEDURE — 97110 THERAPEUTIC EXERCISES: CPT | Performed by: PHYSICAL THERAPIST

## 2018-04-17 PROCEDURE — 97530 THERAPEUTIC ACTIVITIES: CPT | Performed by: PHYSICAL THERAPIST

## 2018-08-27 ENCOUNTER — OFFICE VISIT (OUTPATIENT)
Dept: ORTHOPEDIC SURGERY | Age: 58
End: 2018-08-27

## 2018-08-27 VITALS
WEIGHT: 236 LBS | SYSTOLIC BLOOD PRESSURE: 135 MMHG | HEIGHT: 62 IN | DIASTOLIC BLOOD PRESSURE: 73 MMHG | BODY MASS INDEX: 43.43 KG/M2 | HEART RATE: 103 BPM

## 2018-08-27 DIAGNOSIS — T14.8XXA MUSCLE STRAIN: ICD-10-CM

## 2018-08-27 DIAGNOSIS — G57.01 PIRIFORMIS SYNDROME OF RIGHT SIDE: Primary | ICD-10-CM

## 2018-08-27 PROCEDURE — 3017F COLORECTAL CA SCREEN DOC REV: CPT | Performed by: ORTHOPAEDIC SURGERY

## 2018-08-27 PROCEDURE — G8417 CALC BMI ABV UP PARAM F/U: HCPCS | Performed by: ORTHOPAEDIC SURGERY

## 2018-08-27 PROCEDURE — 1036F TOBACCO NON-USER: CPT | Performed by: ORTHOPAEDIC SURGERY

## 2018-08-27 PROCEDURE — G8427 DOCREV CUR MEDS BY ELIG CLIN: HCPCS | Performed by: ORTHOPAEDIC SURGERY

## 2018-08-27 PROCEDURE — 99214 OFFICE O/P EST MOD 30 MIN: CPT | Performed by: ORTHOPAEDIC SURGERY

## 2018-08-27 RX ORDER — MELOXICAM 15 MG/1
15 TABLET ORAL DAILY
Qty: 30 TABLET | Refills: 2 | Status: ON HOLD | OUTPATIENT
Start: 2018-08-27 | End: 2021-05-06

## 2018-08-27 RX ORDER — MELOXICAM 15 MG/1
15 TABLET ORAL DAILY
Qty: 30 TABLET | Refills: 2 | Status: SHIPPED | OUTPATIENT
Start: 2018-08-27 | End: 2018-08-27 | Stop reason: CLARIF

## 2018-08-27 ASSESSMENT — ENCOUNTER SYMPTOMS: BACK PAIN: 1

## 2018-08-27 NOTE — PROGRESS NOTES
ON 9/6/2018] morphine (MS CONTIN) 15 MG extended release tablet Take 1 tablet by mouth daily for 30 days. Umu Rosas Date: 9/6/18 30 tablet 0    baclofen (LIORESAL) 10 MG tablet Take 1 tablet by mouth 2 times daily 60 tablet 5    pregabalin (LYRICA) 100 MG capsule Take 1 capsule by mouth three times daily for 7 days. . 21 capsule 0    morphine (MS CONTIN) 15 MG extended release tablet Take 1 tablet by mouth daily for 17 days  ICD M51.36. 17 tablet 0    morphine (MS CONTIN) 15 MG extended release tablet Take 1 tablet by mouth Daily for 20 days . 20 tablet 0    morphine sulfate ER (MS CONTIN) 15 MG CR tablet Take 1 tablet by mouth daily 30 tablet 0    morphine sulfate ER (MS CONTIN) 15 MG CR tablet Take 1 tablet by mouth daily DONT FILL UNTIL 9/8 30 tablet 0    baclofen (LIORESAL) 10 MG tablet Take 1 tablet by mouth 2 times daily 60 tablet 5    morphine (MSIR) 15 MG tablet Take 1 tablet by mouth daily 30 tablet 0    morphine (MSIR) 15 MG tablet Take 1 tablet by mouth daily Fill after 1/1/16 30 tablet 0    oxyCODONE-acetaminophen (PERCOCET) 5-325 MG per tablet Take 1 tablet by mouth 3 times daily Fill after 1/1/16 90 tablet 0    oxyCODONE-acetaminophen (PERCOCET) 5-325 MG per tablet Take 1 tablet by mouth 3 times daily 90 tablet 0    oxyCODONE-acetaminophen (PERCOCET) 5-325 MG per tablet Take 1 tablet by mouth 3 times daily as needed for Pain. 90 tablet 0    oxyCODONE-acetaminophen (PERCOCET) 7.5-325 MG per tablet Take 2 tablets by mouth every 4 hours as needed for Pain (May take one or two tablets as needed for pain) for 7 days. 60 tablet 0    aspirin (TOYIN ASPIRIN) 325 MG tablet Take 1 tablet by mouth 2 times daily for 30 days. Take the aspirin twice per day with meals. The medication may be stopped after four weeks. 100 tablet 0    SitaGLIPtin-MetFORMIN HCl (JANUMET XR)  MG TB24 Take 1 tablet by mouth daily.         oxyCODONE-acetaminophen (PERCOCET)  MG per tablet Take 60 tablets by extensors, abductors, quads, and hamstrings    Special Tests:  Negative seated straight leg raise. Negative impingement signs. Negative iliopsoas signs. No pain with resisted hip flexion. Other findings: The skin is warm dry and well perfused. Distally neurovascularly intact. Sensation is intact to light touch. LEFT Hip Comparison Examination:    Gait: No use of assistive devises    Inspection:  No swelling, ecchymosis, or erythema. No atrophy appreciated. Palpation: No tenderness over piriformis. No tenderness over SI joint. Nontender to palpation over the iliac crest. Nontender over the greater trochanter. Range of Motion: Normal flexion, extension, external rotation, and internal rotation. Strength:  5/5 hip flexors, hip extensors, abductors, quads, and hamstrings    Special Tests:  Negative impingement signs. Negative iliopsoas signs. No pain with resisted hip flexion. Other findings: The skin is warm dry and well perfused. Distally neurovascularly intact. Sensation is intact to light touch. Diagnostics:  Radiology:     No new XR obtained today. Outside x-rays reviewed from Aug 2, 2018 Extension Mireille Berger: 65yo female with left hip piriformis syndrome with gluteus strain. Plan: Diagnosis and treatment options were reviewed. Recommending formal supervised physical therapy. Follow-up in 4 weeks and/or as needed. Rina Solares is in agreement with this plan. All questions were answered to patient's satisfaction and was encouraged to call with any further questions.       Orders Placed This Encounter   Procedures    Ambulatory referral to Physical Therapy     Referral Priority:   Routine     Referral Type:   Eval and Treat     Referral Reason:   Specialty Services Required     Requested Specialty:   Physical Therapy     [place] Requested:   [facility] [place] [address] [facility]  Date:

## 2018-08-27 NOTE — LETTER
Patient Name: Tony Jensen MRN: [id number]  DOS: 8/27/2018   Diagnosis:   1. Piriformis syndrome of right side    2. Muscle strain    3. Gluteus muscle strain                         Goal:  [x]Decrease Pain and/or Swelling []Increase ROM and/or Flexibility     [x]Increase Function                           [x]Increase Strength and/or Endurance   []Other   Evaluation:  [x]Evaluation and Treatment []KT-1000   []Isokinetic Exam   []Preoperative Eval    Recommended Modalities:  [x]Modalities of Choice      []HCVS            []Electrical Stimulation     [] Remove Dressing  []Ultrasound        []TENS/TNS    [] Lumbar Traction           [] Cervical Traction   []Phonophoresis         []Hot Pack/Cold Pack   []PT Treatment, Unlisted []Other:  Therapeutic Exercises:    []Isometrics    []Range of Motion []Progressive Exer. []Balance Coordination   []Flexibility  []ROM Limited  []Total Hip Replacement   []Passive  []ROM Full   []Total Knee Replacement  []Active Assisted    []Shoulder Impingement Prog  []Active   []Tennis Elbow Program   []Capsular Shift Regular        []Isokinetics      []Spine Program   []Straight Leg Raises  [] Gait    []Fixation                   [] Supine                                              [] Running   [] Extension   [] Prone   [] Throwing   [] Stabilization   [] AB    [] [facility]   [] AD      [] Spine Eval   [] Cervical Eval  [] Conditioning   [] Lumbar  [] Stationary Bike   [] [place]   [] Lumbar Exer.  [] Stairmaster         [] Treadmill   [] Functional Cap [] Aquatic Prog.      [] Return to work    Treatment Program:  Frequency: [] 1x  [x] 2x  [] 3x  [] 4x  [] 5x week  Duration: [x] 1  [] 2  [] 3  [] 4  [] 5 month  Weight Bearing: [] Non  [] 1/4  [] 1/2  [] 3/4  [] Full  ROM: [] Restricted  [] Full  [x] Follow established: gluteus muscle strain and piriformis syndrome      [] Other:

## 2018-08-29 NOTE — PROGRESS NOTES
Past Surgical History:   Procedure Laterality Date    ANKLE ARTHROSCOPY  1993 &1994    left    ANKLE SURGERY  1994    reconstruction and ligament repair    APPENDECTOMY  1987    BLADDER SURGERY  2005    collegen injection    BLADDER SUSPENSION  2003    TVT    CARDIAC CATHETERIZATION  2005    no blockage    CARPAL TUNNEL RELEASE  1988    right    CHOLECYSTECTOMY  1996    w/follow up for necrotic tissue 1 wk later    FRACTURE SURGERY  1967    left forearm   [place] [address]. w/abd. adhesions repair    HYSTERECTOMY  1992    w/right ooperectomy    JOINT REPLACEMENT  2012 knee right    KNEE ARTHROPLASTY  06/12/2012    RIGHT TOTAL KNEE REPLACEMENT              KNEE ARTHROSCOPY  2000 and 2001    right     OTHER SURGICAL HISTORY      insertion and removal several times    OTHER SURGICAL HISTORY  1997    clipping of bile duct sphincter    OTHER SURGICAL HISTORY  2006, 2007    radiofrequency for lower limb  pain    OVARY REMOVAL  1992    left w/abd. adhesions repair    SHOULDER SURGERY      right RC repair 2002, left RC repair 2012   [address]    bowel obst., abd. adhesions repair    TONSILLECTOMY  1985    adenoids    UMBILICAL HERNIA REPAIR  2007       No family history on file.     Social History     Social History    Marital status:      Spouse name: N/A    Number of children: N/A    Years of education: N/A     Social History Main Topics    Smoking status: Former Smoker    Smokeless tobacco: Never Used      Comment: quit 5/2012 and at times continues to have a few cigarettes    Alcohol use Yes      Comment: occassional    Drug use: No    Sexual activity: Not Asked     Other Topics Concern    None     Social History Narrative    None       Current Outpatient Prescriptions   Medication Sig Dispense Refill    meloxicam (MOBIC) 15 MG tablet Take 1 tablet by mouth daily 30 tablet 2    morphine (MS CONTIN) 15 MG extended release tablet Take 1 tablet by mg by mouth daily.  VITAMIN D, CHOLECALCIFEROL, PO Take 2,000 Int'l Units by mouth daily. No current facility-administered medications for this visit. Allergies   Allergen Reactions    Latex Hives     Blisters and  hives w/gloves,   pantyhose, bandaids, balloons    Accolate [Zafirlukast] Hives, Shortness Of Breath and Rash     wheezing    Avelox [Moxifloxacin Hcl In Nacl] Shortness Of Breath and Rash     wheezing    Ceclor [Cefaclor] Anaphylaxis    Cipro Xr      Rash over body, Severe abdominal pain    Levofloxacin Shortness Of Breath     Wheezing      Adhesive Tape      Blisters, skin peels    Demerol      migraine    Detrol [Tolterodine Tartrate]      Severe abdominal pain    Ditropan [Oxybutynin Chloride]      Severe abdominal pain    Macrobid [Nitrofurantoin Monohydrate Macrocrystals]      Flu-like sx, severe abdominal pain    Other      Scent of Waterless hand wash caused nasal  Mucosa swelling    Prochlorperazine Edisylate      Stiffness and contracture of facial muscles    Vesicare [Solifenacin Succinate]      Severe abdominal pain    Betadine [Povidone Iodine] Hives and Rash    Neurontin [Gabapentin] Rash     GI distress    Nsaids Itching     GI distress       Vital signs:  /73   Pulse 103   Ht 5' 2\" (1.575 m)   Wt 236 lb (107 kg)   BMI 43.16 kg/m²        Neuro: Alert & oriented x 3,  normal,  no focal deficits noted. Normal affect. Eyes: sclera clear  Ears: Normal external ear  Mouth:  No perioral lesions  Pulm: Respirations unlabored and regular  Pulse: Regular rate    Skin: Warm, well perfused        RIGHT Hip Examination:    Gait: No use of assistive devises    Inspection:  No swelling, ecchymosis, or erythema. No atrophy appreciated. Palpation: Tenderness over piriformis. No tenderness over SI joint. Nontender to palpation over the iliac crest. Nontender over the greater trochanter.      Range of Motion: Normal flexion, extension, external rotation, and internal rotation without pain. Strength:  5/5 hip flexors, hip extensors, abductors, quads, and hamstrings    Special Tests:  Negative seated straight leg raise. Negative impingement signs. Negative iliopsoas signs. No pain with resisted hip flexion. Other findings: The skin is warm dry and well perfused. Distally neurovascularly intact. Sensation is intact to light touch. LEFT Hip Comparison Examination:    Gait: No use of assistive devises    Inspection:  No swelling, ecchymosis, or erythema. No atrophy appreciated. Palpation: No tenderness over piriformis. No tenderness over SI joint. Nontender to palpation over the iliac crest. Nontender over the greater trochanter. Range of Motion: Normal flexion, extension, external rotation, and internal rotation. Strength:  5/5 hip flexors, hip extensors, abductors, quads, and hamstrings    Special Tests:  Negative impingement signs. Negative iliopsoas signs. No pain with resisted hip flexion. Other findings: The skin is warm dry and well perfused. Distally neurovascularly intact. Sensation is intact to light touch. Diagnostics:  Radiology:     No new XR obtained today. Outside x-rays reviewed from Aug 2, 2018 Extension Mireille Berger: 63yo female with left hip piriformis syndrome with gluteus strain. Plan: Diagnosis and treatment options were reviewed. Recommending formal supervised physical therapy. Follow-up in 4 weeks and/or as needed. Rina Solares is in agreement with this plan. All questions were answered to patient's satisfaction and was encouraged to call with any further questions.       Orders Placed This Encounter   Procedures    Ambulatory referral to Physical Therapy     Referral Priority:   Routine     Referral Type:   Eval and Treat     Referral Reason:   Specialty Services Required     Requested Specialty:   Physical Therapy     Number of Visits Requested:   [facility] [place], [place]  [facility]  Date:    8/29/2018      During this examination, I, Dominic [address], PA-C, functioned as a scribe for Dr. Alan Dial. The history taking and physical examination were performed by Dr. Alan Dial. All counseling during the appointment was performed between the patient and Dr. Alan Dial. 8/29/2018 6:22 PM      This dictation was performed with a verbal recognition program (DRAGON) and it was checked for errors. It is possible that there are still dictated errors within this office note. If so, please bring any errors to my attention for an addendum. All efforts were made to ensure that this office note is accurate. Greater than 50% of the visit was spent counseling the patient. I personally reviewed the patient's pain scale, review of systems, family history, social history, past medical history, allergies and medications. 13 point review of systems was collected and reviewed today. It is noncontributory. I personally performed the services described in this documentation and scribed by [facility]. Brijesh Valle MD  Sports Medicine, Arthroscopic Knee and Shoulder Surgery    This dictation was performed with a verbal recognition program [facility]) and it was checked for errors. It is possible that there are still dictated errors within this office note. If so, please bring any errors to my attention for an addendum. All efforts were made to ensure that this office note is accurate.

## 2018-08-30 ENCOUNTER — EVALUATION (OUTPATIENT)
Dept: PHYSICAL THERAPY | Age: 58
End: 2018-08-30

## 2018-08-30 DIAGNOSIS — S76.011S MUSCLE STRAIN OF RIGHT GLUTEAL REGION, SEQUELA: ICD-10-CM

## 2018-08-30 DIAGNOSIS — S76.311D STRAIN OF RIGHT PIRIFORMIS MUSCLE, SUBSEQUENT ENCOUNTER: ICD-10-CM

## 2018-08-30 DIAGNOSIS — M25.551 RIGHT HIP PAIN: Primary | ICD-10-CM

## 2018-08-30 PROCEDURE — 1101F PT FALLS ASSESS-DOCD LE1/YR: CPT | Performed by: PHYSICAL THERAPIST

## 2018-08-30 PROCEDURE — G8539 DOC FUNCT AND CARE PLAN: HCPCS | Performed by: PHYSICAL THERAPIST

## 2018-08-30 PROCEDURE — 97530 THERAPEUTIC ACTIVITIES: CPT | Performed by: PHYSICAL THERAPIST

## 2018-08-30 PROCEDURE — G8427 DOCREV CUR MEDS BY ELIG CLIN: HCPCS | Performed by: PHYSICAL THERAPIST

## 2018-08-30 PROCEDURE — G8730 PAIN DOC POS AND PLAN: HCPCS | Performed by: PHYSICAL THERAPIST

## 2018-08-30 PROCEDURE — 97110 THERAPEUTIC EXERCISES: CPT | Performed by: PHYSICAL THERAPIST

## 2018-08-30 PROCEDURE — G8978 MOBILITY CURRENT STATUS: HCPCS | Performed by: PHYSICAL THERAPIST

## 2018-08-30 PROCEDURE — G8979 MOBILITY GOAL STATUS: HCPCS | Performed by: PHYSICAL THERAPIST

## 2018-08-30 PROCEDURE — 97161 PT EVAL LOW COMPLEX 20 MIN: CPT | Performed by: PHYSICAL THERAPIST

## 2018-08-30 NOTE — PLAN OF CARE
She has tried to do some stretches here and there, maybe helps a little, also using ice. She is taking a prescription anti inflammatory. Just started this a couple days ago. Relevant Medical History:  Has had left and right TKA, Type 2 diabetes, HTN, OA, lumbar issues/stimulator for pain control  Functional Disability Index:PT G-Codes  Functional Assessment Tool Used: HOS  Score: 74% disability  Functional Limitation: Mobility: Walking and moving around  Mobility: Walking and Moving Around Current Status (): At least 60 percent but less than 80 percent impaired, limited or restricted  Mobility: Walking and Moving Around Goal Status ():  At least 1 percent but less than 20 percent impaired, limited or restricted    Pain Scale: 5-7/10  Easing factors: laying on her back  Provocative factors: any twisting motions, turning in bed, up/down steps    Type: [x]Constant   []Intermittent  []Radiating [x]Localized []other:     Numbness/Tingling: none related to this    Occupation/School:  Retired/disabled    Living Status/Prior Level of Function: Independent with ADLs and IADLs, unable to do any working out/aquatic exercises, limited with walking and stairs    OBJECTIVE:     ROM PROM AROM Comments    Left Right Left Right    Flexion   WNL WNL    Extension        Abduction   WNL WNL    Adduction        ER   WNL WNL    IR   WNL WNL              Strength Left Right Comments   Hip flexors 5/5 4/5*    Hip extension      Hip abduction 4+/5 4/5    Hip adduction 5/5 5/5    Hip ER 4+/5 4/5    Hip IR 4+/5 4/5*    Quads      Hamstrings          Flexibility Left Right Comments   Hamstrings WNL Mild restriction    ITB (Obers test)      Hip flexor(Antony test)      gastroc      Rectus femoris(Elys test)              Special  Test Left Right Comments   FABERS  (+) for restriction    Scour test  (+)    Impingement test      Trendelenburg test                Reflexes/Sensation:    [x]Dermatomes/Myotomes intact []Reflexes equal and normal bilaterally   []Other:    Joint mobility:    [x]Normal    []Hypo   []Hyper    Palpation: Increased TTP right glute med and piriformis. NO tenderness on her greater troch bursa    Functional Mobility/Transfers: independent    Posture: NA    Bandages/Dressings/Incisions: NA    Gait: (include devices/WB status)  No assistive device, but tends to limp a little over her right LE, slow/deliberet gait     Orthopedic Special Tests: see above                       [x] Patient history, allergies, meds reviewed. Medical chart reviewed. See intake form. Review Of Systems (ROS):  [x]Performed Review of systems (Integumentary, CardioPulmonary, Neurological) by intake and observation. Intake form has been scanned into medical record. Patient has been instructed to contact their primary care physician regarding ROS issues if not already being addressed at this time.       Co-morbidities/Complexities (which will affect course of rehabilitation):   []None           Arthritic conditions   []Rheumatoid arthritis (M05.9)  [x]Osteoarthritis (M19.91)   Cardiovascular conditions   [x]Hypertension (I10)  []Hyperlipidemia (E78.5)  []Angina pectoris (I20)  []Atherosclerosis (I70)   Musculoskeletal conditions   []Disc pathology   []Congenital spine pathologies   []Prior surgical intervention  []Osteoporosis (M81.8)  []Osteopenia (M85.8)   Endocrine conditions   []Hypothyroid (E03.9)  []Hyperthyroid Gastrointestinal conditions   []Constipation (H16.81)   Metabolic conditions   []Morbid obesity (E66.01)  [x]Diabetes type 1(E10.65) or 2 (E11.65)   []Neuropathy (G60.9)     Pulmonary conditions   []Asthma (J45)  []Coughing   []COPD (J44.9)   Psychological Disorders  [x]Anxiety (F41.9)  [x]Depression (F32.9)   []Other:   []Other:          Barriers to/and or personal factors that will affect rehab potential:              []Age  []Sex              []Motivation/Lack of Motivation [x]Co-Morbidities              []Cognitive Function, education/learning barriers              []Environmental, home barriers              []profession/work barriers  [x]past PT/medical experience  []other:  Justification:     Falls Risk Assessment (30 days):   [x] Falls Risk assessed and no intervention required. [] Falls Risk assessed and Patient requires intervention due to being higher risk   TUG score (>12s at risk):     [] Falls education provided, including       G-Codes:  PT G-Codes  Functional Assessment Tool Used: HOS  Score: 74% disability  Functional Limitation: Mobility: Walking and moving around  Mobility: Walking and Moving Around Current Status (): At least 60 percent but less than 80 percent impaired, limited or restricted  Mobility: Walking and Moving Around Goal Status (): At least 1 percent but less than 20 percent impaired, limited or restricted        Pain  [x]  Pain diagram was completed, pain was present and a follow up plan to address the pain is in the plan of care. ()   []  Pain diagram was completed and there was no pain present and therefore no follow up plan to address pain in the plan of care. ()   []  Pain diagram was not completed and the reason for not completing the pain diagram is in the medical chart ()  []  Patient refused to participate   []  Severe mental or physical capacity, patient is in urgent emergent situation and to complete the questionnaire would jeopardize the patient's health status        Functional Questionnaire  [x]  Patient completed the functional outcome questionnaire and deficiencies were addressed in the plan of care. ()   []  Patient completed the outcome questionnaire and there were no functional deficits identified and therefore no plan of care is required. ()   []  Patient did not complete the functional outcome questionnaire and the reason why is documented in the medical chart. ()  []  Patient refused to

## 2018-09-04 ENCOUNTER — TREATMENT (OUTPATIENT)
Dept: PHYSICAL THERAPY | Age: 58
End: 2018-09-04

## 2018-09-04 DIAGNOSIS — S76.011S MUSCLE STRAIN OF RIGHT GLUTEAL REGION, SEQUELA: ICD-10-CM

## 2018-09-04 DIAGNOSIS — S76.311D STRAIN OF RIGHT PIRIFORMIS MUSCLE, SUBSEQUENT ENCOUNTER: ICD-10-CM

## 2018-09-04 DIAGNOSIS — M25.551 RIGHT HIP PAIN: Primary | ICD-10-CM

## 2018-09-04 PROCEDURE — 97140 MANUAL THERAPY 1/> REGIONS: CPT | Performed by: PHYSICAL THERAPIST

## 2018-09-04 PROCEDURE — 97530 THERAPEUTIC ACTIVITIES: CPT | Performed by: PHYSICAL THERAPIST

## 2018-09-04 PROCEDURE — G8427 DOCREV CUR MEDS BY ELIG CLIN: HCPCS | Performed by: PHYSICAL THERAPIST

## 2018-09-04 PROCEDURE — 97110 THERAPEUTIC EXERCISES: CPT | Performed by: PHYSICAL THERAPIST

## 2018-09-04 NOTE — FLOWSHEET NOTE
[facility], [place]   Phone: [phone]    Fax: [phone]                                                       Physical Therapy Daily Treatment Note  Date:  2018    Patient Name:  Hilario Flanagan    :  1960  MRN: [id number]  Medical/Treatment Diagnosis Information:  Diagnosis: Left hip pain/glute strain     Insurance/Certification information:  PT Insurance Information: Medicare  Physician Information:  Referring Practitioner: Dr Merly Gonzales of care signed (Y/N):     Date of Patient follow up with Physician: 10/8/18    G-Code (if applicable):      Date G-Code Applied:  18  PT G-Codes  Functional Assessment Tool Used: HOS  Score: 74% disability  Functional Limitation: Mobility: Walking and moving around  Mobility: Walking and Moving Around Current Status (): At least 60 percent but less than 80 percent impaired, limited or restricted  Mobility: Walking and Moving Around Goal Status (): At least 1 percent but less than 20 percent impaired, limited or restricted    PQRS: 18  Reviewed medication list with patient. No changes since last PT visit. Progress Note: []  Yes  []  No  Next due by: Visit #10       Latex Allergy:  [x]NO      []YES  Preferred Language for Healthcare:   [x]English       []other:    Visit # Insurance Allowable   12 25   10 visits earlier this year for her shoulder      Pain level:  5-7/10     SUBJECTIVE:    She reports that she has had a rough couple of days. Pain has remained in her right hip area. Really limiting her sleep. Unable to lay on her right side at all.             OBJECTIVE:              ROM PROM AROM Comments     Left Right Left Right     Flexion     WNL WNL     Extension             Abduction     WNL WNL     Adduction             ER     WNL WNL     IR     WNL WNL                      Strength Left Right Comments   Hip flexors 5/5 4/5*     Hip extension         Hip abduction 4+/5 4/5     Hip adduction 5/5 5/5     Hip ER 4+/5 4/5     Hip IR 4+/5 4/5*     Quads         Hamstrings               Flexibility Left Right Comments   Hamstrings WNL Mild restriction     ITB (Obers test)         Hip flexor(Antony test)         gastroc         Rectus femoris(Elys test)                      Special  Test Left Right Comments   FABERS   (+) for restriction     Scour test   (+)     Impingement test         Trendelenburg test                         Reflexes/Sensation:               [x]Dermatomes/Myotomes intact               []Reflexes equal and normal bilaterally              []Other:     Joint mobility:               [x]Normal               []Hypo              []Hyper     Palpation: Increased TTP right glute med and piriformis.    NO tenderness on her greater troch bursa       RESTRICTIONS/PRECAUTIONS: none    Exercises/Interventions:       Exercise/Equipment Resistance Repetitions Other comments   Stretching      Hamstring 5x30\"     Hip Flexion      ITB- Rope 5x30\"     Grion      Quad      Inclined Calf      Towel Pull      Piriformis Figure 4 pull in 5x30\"      Figure 4 push knee out 5x30\"            SDLY ITB stretch 30\"x3     SLR      Supine      Prone      Abduction      Adducton      SLR+            Isometrics      Quad sets      Ball Squeezes 5\"x20     Pelvic Tilts 5\"x20       Patellar Glides      Medial      Superior      Inferior            ROM      Passive      Active      Weight Shift      Hang Weights      Sheet Pulls      Ankle Pumps            CKC      Calf raises      Wall sits      Step ups      1 leg stand      Squatting      CC TKE      Balance      Monster Walks      Bridging      Triple threats      Stool Scoots      PRE      Extension   RANGE:   Flexion   RANGE:         Manual LE stretching, Foamroll to ITB and post hip, manual STM to TFL/glute med x12'           Leg Press   RANGE:         Bike      Treadmill                  Therapeutic Exercise and NMR EXR  [x] (72739) Provided verbal/tactile ambulation. Modalities: Ice to right hip area x10' after    Charges:  Timed Code Treatment Minutes: 40   Total Treatment Minutes: 55     [] EVAL (LOW) 66343 (typically 20 minutes face-to-face)  [] EVAL (MOD) 28283 (typically 30 minutes face-to-face)  [] EVAL (HIGH) 34041 (typically 45 minutes face-to-face)  [] RE-EVAL   [x] NQ(82553) x  1   [] IONTO  [] NMR (16512) x      [] VASO  [x] Manual (37194) x  1    [] Other:  [x] TA (11918) x  1    [] Mech Traction (96163)  [] ES(attended) (11729)      [] ES (un) (84837):     GOALS:  Patient stated goal: return to painfree activities     Therapist goals for Patient:   Short Term Goals: To be achieved in: 2 weeks  1. Independent in HEP and progression per patient tolerance, in order to prevent re-injury. 2. Patient will have a decrease in pain to facilitate improvement in movement, function, and ADLs as indicated by Functional Deficits.     Long Term Goals: To be achieved in: 4 weeks  1. Disability index score of 30% or less for the HOS to assist with reaching prior level of function. 2. Patient will demonstrate increased flexibility to WNL to allow for proper joint functioning as indicated by patients Functional Deficits. 3. Patient will demonstrate an increase in Strength by 1/2 grade in deficient mm to allow for proper functional mobility as indicated by patients Functional Deficits. 4. Patient will return to all functional activities without increased symptoms or restriction. 5. Be ready to be able to walk longer distances while on vacation          Progression Towards Functional goals:  [] Patient is progressing as expected towards functional goals listed. [] Progression is slowed due to complexities listed. [] Progression has been slowed due to co-morbidities. [x] Plan just implemented, too soon to assess goals progression  [] Other:     ASSESSMENT:    We reviewed her HEP and she is performing well.     In some positions, she feels a good stretch, but if she goes too far than it hurts. She really liked the sidelying ITB stretch, felt like she got some good relief in this position. Her leg length and ASIS levels are even. Treatment/Activity Tolerance:  [x] Patient tolerated treatment well [] Patient limited by fatique  [] Patient limited by pain  [] Patient limited by other medical complications  [] Other:     Prognosis: [x] Good [] Fair  [] Poor    Patient Requires Follow-up: [x] Yes  [] No    PLAN: See eval.   Cont 2x/week, she is planning to be on vacation for a week leaving on Sept 17.   Work into STM/foamroll to right lateral hip musculature  [x] Continue per plan of care [] Alter current plan (see comments)  [] Plan of care initiated [] Hold pending MD visit [] Discharge    Electronically signed by:     Ramona Sanderson PT #[id number]

## 2018-09-06 ENCOUNTER — TREATMENT (OUTPATIENT)
Dept: PHYSICAL THERAPY | Age: 58
End: 2018-09-06

## 2018-09-06 DIAGNOSIS — S76.011S MUSCLE STRAIN OF RIGHT GLUTEAL REGION, SEQUELA: ICD-10-CM

## 2018-09-06 DIAGNOSIS — M25.551 RIGHT HIP PAIN: Primary | ICD-10-CM

## 2018-09-06 DIAGNOSIS — S76.311D STRAIN OF RIGHT PIRIFORMIS MUSCLE, SUBSEQUENT ENCOUNTER: ICD-10-CM

## 2018-09-06 PROCEDURE — 97140 MANUAL THERAPY 1/> REGIONS: CPT | Performed by: PHYSICAL THERAPIST

## 2018-09-06 PROCEDURE — 97110 THERAPEUTIC EXERCISES: CPT | Performed by: PHYSICAL THERAPIST

## 2018-09-06 PROCEDURE — G8427 DOCREV CUR MEDS BY ELIG CLIN: HCPCS | Performed by: PHYSICAL THERAPIST

## 2018-09-06 PROCEDURE — 97530 THERAPEUTIC ACTIVITIES: CPT | Performed by: PHYSICAL THERAPIST

## 2018-09-06 NOTE — FLOWSHEET NOTE
RANGE:         Bike      Treadmill                  Therapeutic Exercise and NMR EXR  [x] (89663) Provided verbal/tactile cueing for activities related to strengthening, flexibility, endurance, ROM for improvements in LE, proximal hip, and core control with self care, mobility, lifting, ambulation.  [] (62002) Provided verbal/tactile cueing for activities related to improving balance, coordination, kinesthetic sense, posture, motor skill, proprioception  to assist with LE, proximal hip, and core control in self care, mobility, lifting, ambulation and eccentric single leg control.      NMR and Therapeutic Activities:    [x] (22749 or 97087) Provided verbal/tactile cueing for activities related to improving balance, coordination, kinesthetic sense, posture, motor skill, proprioception and motor activation to allow for proper function of core, proximal hip and LE with self care and ADLs  [] (41575) Gait Re-education- Provided training and instruction to the patient for proper LE, core and proximal hip recruitment and positioning and eccentric body weight control with ambulation re-education including up and down stairs     Home Exercise Program:    [x] (70505) Reviewed/Progressed HEP activities related to strengthening, flexibility, endurance, ROM of core, proximal hip and LE for functional self-care, mobility, lifting and ambulation/stair navigation   [] (35133)Reviewed/Progressed HEP activities related to improving balance, coordination, kinesthetic sense, posture, motor skill, proprioception of core, proximal hip and LE for self care, mobility, lifting, and ambulation/stair navigation      Manual Treatments:  PROM / STM / Oscillations-Mobs:  G-I, II, III, IV (PA's, Inf., Post.)  [x] (85435) Provided manual therapy to mobilize LE, proximal hip and/or LS spine soft tissue/joints for the purpose of modulating pain, promoting relaxation,  increasing ROM, reducing/eliminating soft tissue swelling/inflammation/restriction, improving soft tissue extensibility and allowing for proper ROM for normal function with self care, mobility, lifting and ambulation. Modalities: Ice to right hip area x12' after    Charges:  Timed Code Treatment Minutes: 40   Total Treatment Minutes: 55     [] EVAL (LOW) 82579 (typically 20 minutes face-to-face)  [] EVAL (MOD) 66359 (typically 30 minutes face-to-face)  [] EVAL (HIGH) 45787 (typically 45 minutes face-to-face)  [] RE-EVAL   [x] SN(75405) x  1   [] IONTO  [] NMR (58304) x      [] VASO  [x] Manual (62820) x  1    [] Other:  [x] TA (44359) x  1    [] Mech Traction (12854)  [] ES(attended) (01049)      [] ES (un) (62274):     GOALS:  Patient stated goal: return to painfree activities     Therapist goals for Patient:   Short Term Goals: To be achieved in: 2 weeks  1. Independent in HEP and progression per patient tolerance, in order to prevent re-injury. 2. Patient will have a decrease in pain to facilitate improvement in movement, function, and ADLs as indicated by Functional Deficits.     Long Term Goals: To be achieved in: 4 weeks  1. Disability index score of 30% or less for the HOS to assist with reaching prior level of function. 2. Patient will demonstrate increased flexibility to WNL to allow for proper joint functioning as indicated by patients Functional Deficits. 3. Patient will demonstrate an increase in Strength by 1/2 grade in deficient mm to allow for proper functional mobility as indicated by patients Functional Deficits. 4. Patient will return to all functional activities without increased symptoms or restriction. 5. Be ready to be able to walk longer distances while on vacation          Progression Towards Functional goals:  [] Patient is progressing as expected towards functional goals listed. [] Progression is slowed due to complexities listed. [] Progression has been slowed due to co-morbidities.   [x] Plan just implemented, too soon to assess goals progression  []

## 2018-09-10 ENCOUNTER — TREATMENT (OUTPATIENT)
Dept: PHYSICAL THERAPY | Age: 58
End: 2018-09-10

## 2018-09-10 DIAGNOSIS — M25.551 RIGHT HIP PAIN: Primary | ICD-10-CM

## 2018-09-10 DIAGNOSIS — S76.311D STRAIN OF RIGHT PIRIFORMIS MUSCLE, SUBSEQUENT ENCOUNTER: ICD-10-CM

## 2018-09-10 DIAGNOSIS — S76.011S MUSCLE STRAIN OF RIGHT GLUTEAL REGION, SEQUELA: ICD-10-CM

## 2018-09-10 PROCEDURE — 97530 THERAPEUTIC ACTIVITIES: CPT | Performed by: PHYSICAL THERAPIST

## 2018-09-10 PROCEDURE — 97110 THERAPEUTIC EXERCISES: CPT | Performed by: PHYSICAL THERAPIST

## 2018-09-10 PROCEDURE — G8427 DOCREV CUR MEDS BY ELIG CLIN: HCPCS | Performed by: PHYSICAL THERAPIST

## 2018-09-10 PROCEDURE — 97140 MANUAL THERAPY 1/> REGIONS: CPT | Performed by: PHYSICAL THERAPIST

## 2018-09-10 NOTE — FLOWSHEET NOTE
[facility]   Phone: [phone]    Fax: [phone]                                                       Physical Therapy Daily Treatment Note  Date:  9/10/2018    Patient Name:  Ayaan Basilio    :  1960  MRN: [id number]  Medical/Treatment Diagnosis Information:  Diagnosis: Left hip pain/glute strain     Insurance/Certification information:  PT Insurance Information: Medicare  Physician Information:  Referring Practitioner: Dr Chayito Dias of care signed (Y/N):     Date of Patient follow up with Physician: 10/8/18    G-Code (if applicable):      Date G-Code Applied:  18  PT G-Codes  Functional Assessment Tool Used: HOS  Score: 74% disability  Functional Limitation: Mobility: Walking and moving around  Mobility: Walking and Moving Around Current Status (): At least 60 percent but less than 80 percent impaired, limited or restricted  Mobility: Walking and Moving Around Goal Status (): At least 1 percent but less than 20 percent impaired, limited or restricted    PQRS: 9/10/18  Reviewed medication list with patient. No changes since last PT visit. Progress Note: []  Yes  []  No  Next due by: Visit #10       Latex Allergy:  [x]NO      []YES  Preferred Language for Healthcare:   [x]English       []other:    Visit # Insurance Allowable   14 25   10 visits earlier this year for her shoulder      Pain level:  5-7/10     SUBJECTIVE:    She reports that she slept about 4 hours Saturday night and 5 hours  night. Her hip is feeling just a bit better. She states she is still having some trouble with rolling in bed and going up/down stairs.             OBJECTIVE:              ROM PROM AROM Comments     Left Right Left Right     Flexion     WNL WNL     Extension             Abduction     WNL WNL     Adduction             ER     WNL WNL     IR     WNL WNL                      Strength Left Right Comments   Hip flexors 5/5 4/5*     Hip

## 2018-09-13 ENCOUNTER — TREATMENT (OUTPATIENT)
Dept: PHYSICAL THERAPY | Age: 58
End: 2018-09-13

## 2018-09-13 DIAGNOSIS — S76.311D STRAIN OF RIGHT PIRIFORMIS MUSCLE, SUBSEQUENT ENCOUNTER: ICD-10-CM

## 2018-09-13 DIAGNOSIS — M25.551 RIGHT HIP PAIN: Primary | ICD-10-CM

## 2018-09-13 DIAGNOSIS — S76.011S MUSCLE STRAIN OF RIGHT GLUTEAL REGION, SEQUELA: ICD-10-CM

## 2018-09-13 PROCEDURE — 97140 MANUAL THERAPY 1/> REGIONS: CPT | Performed by: PHYSICAL THERAPIST

## 2018-09-13 PROCEDURE — 97530 THERAPEUTIC ACTIVITIES: CPT | Performed by: PHYSICAL THERAPIST

## 2018-09-13 PROCEDURE — G8427 DOCREV CUR MEDS BY ELIG CLIN: HCPCS | Performed by: PHYSICAL THERAPIST

## 2018-09-13 PROCEDURE — 97110 THERAPEUTIC EXERCISES: CPT | Performed by: PHYSICAL THERAPIST

## 2018-09-13 NOTE — FLOWSHEET NOTE
[facility]   Phone: [phone]    Fax: [phone]                                                       Physical Therapy Daily Treatment Note  Date:  2018    Patient Name:  Johana Solano    :  1960  MRN: [id number]  Medical/Treatment Diagnosis Information:  Diagnosis: Left hip pain/glute strain     Insurance/Certification information:  PT Insurance Information: Medicare  Physician Information:  Referring Practitioner: Dr Manolo Talbert of care signed (Y/N):     Date of Patient follow up with Physician: 10/8/18    G-Code (if applicable):      Date G-Code Applied:  18  PT G-Codes  Functional Assessment Tool Used: HOS  Score: 74% disability  Functional Limitation: Mobility: Walking and moving around  Mobility: Walking and Moving Around Current Status (): At least 60 percent but less than 80 percent impaired, limited or restricted  Mobility: Walking and Moving Around Goal Status (): At least 1 percent but less than 20 percent impaired, limited or restricted    PQRS: 18  Reviewed medication list with patient. No changes since last PT visit. Progress Note: []  Yes  []  No  Next due by: Visit #10       Latex Allergy:  [x]NO      []YES  Preferred Language for Healthcare:   [x]English       []other:    Visit # Insurance Allowable   15 25   10 visits earlier this year for her shoulder      Pain level:  5-7/10     SUBJECTIVE:     She states she feels worse today, almost back to where it was when her hip first started to bother her. She reports that she went to her grandson's school for grandparents day yesterday. Had to do a lot of walking from her car into the school to the classroom. She was just wore out afterwards. Got home, laid down and tried to do some stretching and ice.       OBJECTIVE:              ROM PROM AROM Comments     Left Right Left Right     Flexion     WNL WNL     Extension             Abduction     WNL WNL   Adduction             ER     WNL WNL     IR     WNL WNL                      Strength Left Right Comments   Hip flexors 5/5 4/5*     Hip extension         Hip abduction 4+/5 4/5     Hip adduction 5/5 5/5     Hip ER 4+/5 4/5     Hip IR 4+/5 4/5*     Quads         Hamstrings               Flexibility Left Right Comments   Hamstrings WNL Mild restriction     ITB (Obers test)         Hip flexor(Antony test)         gastroc         Rectus femoris(Elys test)                      Special  Test Left Right Comments   FABERS   (+) for restriction     Scour test   (+)     Impingement test         Trendelenburg test                         Reflexes/Sensation:               [x]Dermatomes/Myotomes intact               []Reflexes equal and normal bilaterally              []Other:     Joint mobility:               [x]Normal               []Hypo              []Hyper     Palpation: Increased TTP right glute med and piriformis.    NO tenderness on her greater troch bursa       RESTRICTIONS/PRECAUTIONS: none    Exercises/Interventions:       Exercise/Equipment Resistance Repetitions Other comments   Stretching      Hamstring 5x30\"     Hip Flexion      ITB- Rope 5x30\"     Grion      Quad/hip flexor Supine leg off edge of table 30\"x3     Inclined Calf      Towel Pull      Piriformis Figure 4 pull in 5x30\"      Figure 4 push knee out 5x30\"            SDLY ITB stretch 30\"x3      Stand ITB stretch 30\"x4           SLR      Supine      Prone      Abduction      Adducton      SLR+            Isometrics      Quad sets      Ball Squeezes 5\"x20     Pelvic Tilts 5\"x20       Patellar Glides      Medial      Superior      Inferior            ROM      Passive      Active AROM SDLY clams and rev clams x20 each     Weight Shift      Hang Weights      Sheet Pulls      Ankle Pumps            CKC      Calf raises      Wall sits      Step ups      1 leg stand      Squatting      CC TKE      Balance      Monster Walks      Bridging      Triple threats

## 2018-09-25 ENCOUNTER — TREATMENT (OUTPATIENT)
Dept: PHYSICAL THERAPY | Age: 58
End: 2018-09-25
Payer: MEDICARE

## 2018-09-25 ENCOUNTER — TELEPHONE (OUTPATIENT)
Dept: ORTHOPEDIC SURGERY | Age: 58
End: 2018-09-25

## 2018-09-25 DIAGNOSIS — S76.311D STRAIN OF RIGHT PIRIFORMIS MUSCLE, SUBSEQUENT ENCOUNTER: ICD-10-CM

## 2018-09-25 DIAGNOSIS — S76.011S MUSCLE STRAIN OF RIGHT GLUTEAL REGION, SEQUELA: ICD-10-CM

## 2018-09-25 DIAGNOSIS — M25.551 RIGHT HIP PAIN: Primary | ICD-10-CM

## 2018-09-25 PROCEDURE — 97110 THERAPEUTIC EXERCISES: CPT | Performed by: PHYSICAL THERAPIST

## 2018-09-25 PROCEDURE — 97140 MANUAL THERAPY 1/> REGIONS: CPT | Performed by: PHYSICAL THERAPIST

## 2018-09-25 PROCEDURE — G8427 DOCREV CUR MEDS BY ELIG CLIN: HCPCS | Performed by: PHYSICAL THERAPIST

## 2018-09-25 NOTE — FLOWSHEET NOTE
IR     WNL WNL                      Strength Left Right Comments   Hip flexors 5/5 4/5*     Hip extension         Hip abduction 4+/5 4/5     Hip adduction 5/5 5/5     Hip ER 4+/5 4/5     Hip IR 4+/5 4/5*     Quads         Hamstrings               Flexibility Left Right Comments   Hamstrings WNL Mild restriction     ITB (Obers test)         Hip flexor(Antony test)         gastroc         Rectus femoris(Elys test)                      Special  Test Left Right Comments   FABERS   (+) for restriction     Scour test   (+)     Impingement test         Trendelenburg test                         Reflexes/Sensation:               [x]Dermatomes/Myotomes intact               []Reflexes equal and normal bilaterally              []Other:     Joint mobility:               [x]Normal               []Hypo              []Hyper     Palpation: Increased TTP right glute med and piriformis.    NO tenderness on her greater troch bursa       RESTRICTIONS/PRECAUTIONS: none    Exercises/Interventions:       Exercise/Equipment Resistance Repetitions Other comments   Stretching      Hamstring 5x30\"     Hip Flexion      ITB- Rope 5x30\"     Grion      Quad/hip flexor Supine leg off edge of table 30\"x3     Inclined Calf      Towel Pull      Piriformis Figure 4 pull in 5x30\"      Figure 4 push knee out 5x30\"            SDLY ITB stretch 30\"x3      Stand ITB stretch 30\"x4      Supine LTR x15     SLR      Supine      Prone      Abduction      Adducton      SLR+            Isometrics      Quad sets      Ball Squeezes 5\"x20     Pelvic Tilts 5\"x20       Patellar Glides      Medial      Superior      Inferior            ROM      Passive          Weight Shift      Hang Weights      Sheet Pulls      Ankle Pumps            CKC      Calf raises      Wall sits      Step ups      1 leg stand      Squatting      CC TKE      Balance      Monster Walks      Bridging      Triple threats      Stool Scoots      PRE      Extension   RANGE:   Flexion   RANGE: Manual LE stretching, Foamroll to ITB and post hip, manual STM to TFL/glute med x12'      Right LE long leg distraction/oscillations 30\"x3     Leg Press   RANGE:         Bike      Treadmill                  Therapeutic Exercise and NMR EXR  [x] (05541) Provided verbal/tactile cueing for activities related to strengthening, flexibility, endurance, ROM for improvements in LE, proximal hip, and core control with self care, mobility, lifting, ambulation.  [] (75028) Provided verbal/tactile cueing for activities related to improving balance, coordination, kinesthetic sense, posture, motor skill, proprioception  to assist with LE, proximal hip, and core control in self care, mobility, lifting, ambulation and eccentric single leg control.      NMR and Therapeutic Activities:    [x] (45512 or 73746) Provided verbal/tactile cueing for activities related to improving balance, coordination, kinesthetic sense, posture, motor skill, proprioception and motor activation to allow for proper function of core, proximal hip and LE with self care and ADLs  [] (48780) Gait Re-education- Provided training and instruction to the patient for proper LE, core and proximal hip recruitment and positioning and eccentric body weight control with ambulation re-education including up and down stairs     Home Exercise Program:    [x] (98799) Reviewed/Progressed HEP activities related to strengthening, flexibility, endurance, ROM of core, proximal hip and LE for functional self-care, mobility, lifting and ambulation/stair navigation   [] (87766)Reviewed/Progressed HEP activities related to improving balance, coordination, kinesthetic sense, posture, motor skill, proprioception of core, proximal hip and LE for self care, mobility, lifting, and ambulation/stair navigation      Manual Treatments:  PROM / STM / Oscillations-Mobs:  G-I, II, III, IV (PA's, Inf., Post.)  [x] (42276) Provided manual therapy to mobilize LE, proximal hip and/or LS spine soft

## 2018-09-28 ENCOUNTER — TREATMENT (OUTPATIENT)
Dept: PHYSICAL THERAPY | Age: 58
End: 2018-09-28

## 2018-09-28 DIAGNOSIS — M25.551 RIGHT HIP PAIN: Primary | ICD-10-CM

## 2018-09-28 DIAGNOSIS — S76.311D STRAIN OF RIGHT PIRIFORMIS MUSCLE, SUBSEQUENT ENCOUNTER: ICD-10-CM

## 2018-09-28 DIAGNOSIS — S76.011S MUSCLE STRAIN OF RIGHT GLUTEAL REGION, SEQUELA: ICD-10-CM

## 2018-10-08 ENCOUNTER — OFFICE VISIT (OUTPATIENT)
Dept: ORTHOPEDIC SURGERY | Age: 58
End: 2018-10-08
Payer: MEDICARE

## 2018-10-08 VITALS
BODY MASS INDEX: 43.43 KG/M2 | HEART RATE: 89 BPM | DIASTOLIC BLOOD PRESSURE: 75 MMHG | HEIGHT: 62 IN | WEIGHT: 236 LBS | SYSTOLIC BLOOD PRESSURE: 139 MMHG

## 2018-10-08 DIAGNOSIS — G57.01 PIRIFORMIS SYNDROME OF RIGHT SIDE: Primary | ICD-10-CM

## 2018-10-08 DIAGNOSIS — T14.8XXA MUSCLE STRAIN: ICD-10-CM

## 2018-10-08 PROCEDURE — 1036F TOBACCO NON-USER: CPT | Performed by: ORTHOPAEDIC SURGERY

## 2018-10-08 PROCEDURE — G8417 CALC BMI ABV UP PARAM F/U: HCPCS | Performed by: ORTHOPAEDIC SURGERY

## 2018-10-08 PROCEDURE — 99214 OFFICE O/P EST MOD 30 MIN: CPT | Performed by: ORTHOPAEDIC SURGERY

## 2018-10-08 PROCEDURE — G8427 DOCREV CUR MEDS BY ELIG CLIN: HCPCS | Performed by: ORTHOPAEDIC SURGERY

## 2018-10-08 PROCEDURE — G8484 FLU IMMUNIZE NO ADMIN: HCPCS | Performed by: ORTHOPAEDIC SURGERY

## 2018-10-08 PROCEDURE — 3017F COLORECTAL CA SCREEN DOC REV: CPT | Performed by: ORTHOPAEDIC SURGERY

## 2018-10-08 ASSESSMENT — ENCOUNTER SYMPTOMS: BACK PAIN: 1

## 2018-10-13 DIAGNOSIS — G57.01 PIRIFORMIS SYNDROME OF RIGHT SIDE: Primary | ICD-10-CM

## 2018-10-13 DIAGNOSIS — T14.8XXA MUSCLE STRAIN: ICD-10-CM

## 2018-10-22 ENCOUNTER — OFFICE VISIT (OUTPATIENT)
Dept: ORTHOPEDIC SURGERY | Age: 58
End: 2018-10-22
Payer: MEDICARE

## 2018-10-22 VITALS
BODY MASS INDEX: 43.43 KG/M2 | DIASTOLIC BLOOD PRESSURE: 78 MMHG | HEIGHT: 62 IN | HEART RATE: 100 BPM | WEIGHT: 236 LBS | SYSTOLIC BLOOD PRESSURE: 129 MMHG

## 2018-10-22 DIAGNOSIS — M46.1 SACROILIITIS (HCC): ICD-10-CM

## 2018-10-22 DIAGNOSIS — M76.01 GLUTEAL TENDINITIS OF RIGHT BUTTOCK: Primary | ICD-10-CM

## 2018-10-22 PROCEDURE — 3017F COLORECTAL CA SCREEN DOC REV: CPT | Performed by: ORTHOPAEDIC SURGERY

## 2018-10-22 PROCEDURE — 99214 OFFICE O/P EST MOD 30 MIN: CPT | Performed by: ORTHOPAEDIC SURGERY

## 2018-10-22 PROCEDURE — 1036F TOBACCO NON-USER: CPT | Performed by: ORTHOPAEDIC SURGERY

## 2018-10-22 PROCEDURE — G8417 CALC BMI ABV UP PARAM F/U: HCPCS | Performed by: ORTHOPAEDIC SURGERY

## 2018-10-22 PROCEDURE — G8484 FLU IMMUNIZE NO ADMIN: HCPCS | Performed by: ORTHOPAEDIC SURGERY

## 2018-10-22 PROCEDURE — G8427 DOCREV CUR MEDS BY ELIG CLIN: HCPCS | Performed by: ORTHOPAEDIC SURGERY

## 2018-10-22 ASSESSMENT — ENCOUNTER SYMPTOMS: BACK PAIN: 1

## 2018-10-22 NOTE — PROGRESS NOTES
Review of Systems   Constitutional:        Weight gain   Respiratory:        Asthma   Difficulty breathing   Cardiovascular:        Hypertension  Irregular heartbeat    Gastrointestinal:        Gallbladder removed   Hemorrhoids    Genitourinary:        Loss of urine/incontinence    Musculoskeletal: Positive for back pain, joint pain and neck pain. Right hip pain    Neurological:        Chronic pain  Fibromyalgia    All other systems reviewed and are negative.

## 2018-10-22 NOTE — PROGRESS NOTES
Mrs. Angelica Almendarez is a very pleasant 52-year-old female who is seen back for repeat evaluation of her right hip and CT review. She's been in physical therapy has not seen any benefit. She continues to have marked lateral right hip pain which associated with standing and with walking. She can lie on the side without much discomfort. Her pain is progressive the point that she is requiring an assistive device. She reports that most of her pain is in her lower back that radiates into her thigh. She is seeing pain management for which she is getting facet joint injections. Pain Assessment  Location of Pain: Pelvis  Location Modifiers: Right  Severity of Pain: 8  Quality of Pain: Sharp, Dull, Aching  Duration of Pain: Persistent  Frequency of Pain: Constant  Aggravating Factors: Walking, Stairs, Bending  Limiting Behavior: Yes  Relieving Factors:  (no relief )  Result of Injury: No  Work-Related Injury: No  Are there other pain locations you wish to document?: No    Past Medical History:   Diagnosis Date    Anxiety     Arthritis     knees, shoulders    Asthma     Bladder spasms     has spinal cord stimulator    COPD (chronic obstructive pulmonary disease) (HCC)     Depression     Diabetes mellitus (HCC)     Fibromyalgia     Hyperlipidemia     Hypertension     Migraine     MVP (mitral valve prolapse)     RSD (reflex sympathetic dystrophy)     Sleep apnea     bipap 17/13  is in car        Past Surgical History:   Procedure Laterality Date    ANKLE ARTHROSCOPY  1993 &1994    left    ANKLE SURGERY  1994    reconstruction and ligament repair    APPENDECTOMY  1987    BLADDER SURGERY  2005    collegen injection    BLADDER SUSPENSION  2003    TVT    CARDIAC CATHETERIZATION  2005    no blockage    CARPAL TUNNEL RELEASE  1988    right    CHOLECYSTECTOMY  1996    w/follow up for necrotic tissue 1 wk later    FRACTURE SURGERY  1967    left forearm   [place] [address].  w/abd. adhesions repair    HYSTERECTOMY  1992    w/right ooperectomy    JOINT REPLACEMENT  2012 knee right    KNEE ARTHROPLASTY  06/12/2012    RIGHT TOTAL KNEE REPLACEMENT              KNEE ARTHROSCOPY  2000 and 2001    right     OTHER SURGICAL HISTORY      insertion and removal several times    OTHER SURGICAL HISTORY  1997    clipping of bile duct sphincter    OTHER SURGICAL HISTORY  2006, 2007    radiofrequency for lower limb  pain    OVARY REMOVAL  1992    left w/abd. adhesions repair    SHOULDER SURGERY      right RC repair 2002, left RC repair 2012    SMALL INTESTINE SURGERY  1992    bowel obst., abd. adhesions repair    TONSILLECTOMY  1985    adenoids    UMBILICAL HERNIA REPAIR  2007       History reviewed. No pertinent family history. Social History     Social History    Marital status:      Spouse name: N/A    Number of children: N/A    Years of education: N/A     Social History Main Topics    Smoking status: Former Smoker    Smokeless tobacco: Never Used      Comment: quit 5/2012 and at times continues to have a few cigarettes    Alcohol use Yes      Comment: occassional    Drug use: No    Sexual activity: Not Asked     Other Topics Concern    None     Social History Narrative    None       Current Outpatient Prescriptions   Medication Sig Dispense Refill    morphine (MS CONTIN) 15 MG extended release tablet Take 1 tablet by mouth daily for 30 days. Fatmata Lockhart Date: 10/17/18 30 tablet 0    pregabalin (LYRICA) 100 MG capsule Take 1 capsule by mouth 3 times daily as needed (PAIN) for up to 30 days. . 90 capsule 4    meloxicam (MOBIC) 15 MG tablet Take 1 tablet by mouth daily 30 tablet 2    baclofen (LIORESAL) 10 MG tablet Take 1 tablet by mouth 2 times daily 60 tablet 5    pregabalin (LYRICA) 100 MG capsule Take 1 capsule by mouth three times daily for 7 days. . 21 capsule 0    morphine (MS CONTIN) 15 MG extended release tablet Take 1 tablet by mouth daily for 17 days  ICD M51.36. 17 tablet 0    morphine (MS CONTIN) 15 MG extended release tablet Take 1 tablet by mouth Daily for 20 days . 20 tablet 0    morphine sulfate ER (MS CONTIN) 15 MG CR tablet Take 1 tablet by mouth daily 30 tablet 0    morphine sulfate ER (MS CONTIN) 15 MG CR tablet Take 1 tablet by mouth daily DONT FILL UNTIL 9/8 30 tablet 0    baclofen (LIORESAL) 10 MG tablet Take 1 tablet by mouth 2 times daily 60 tablet 5    morphine (MSIR) 15 MG tablet Take 1 tablet by mouth daily 30 tablet 0    morphine (MSIR) 15 MG tablet Take 1 tablet by mouth daily Fill after 1/1/16 30 tablet 0    oxyCODONE-acetaminophen (PERCOCET) 5-325 MG per tablet Take 1 tablet by mouth 3 times daily Fill after 1/1/16 90 tablet 0    oxyCODONE-acetaminophen (PERCOCET) 5-325 MG per tablet Take 1 tablet by mouth 3 times daily 90 tablet 0    oxyCODONE-acetaminophen (PERCOCET) 5-325 MG per tablet Take 1 tablet by mouth 3 times daily as needed for Pain. 90 tablet 0    oxyCODONE-acetaminophen (PERCOCET) 7.5-325 MG per tablet Take 2 tablets by mouth every 4 hours as needed for Pain (May take one or two tablets as needed for pain) for 7 days. 60 tablet 0    aspirin (Broadchoice ASPIRIN) 325 MG tablet Take 1 tablet by mouth 2 times daily for 30 days. Take the aspirin twice per day with meals. The medication may be stopped after four weeks. 100 tablet 0    SitaGLIPtin-MetFORMIN HCl (JANUMET XR)  MG TB24 Take 1 tablet by mouth daily.  oxyCODONE-acetaminophen (PERCOCET)  MG per tablet Take 60 tablets by mouth every 4 hours as needed for Pain (may take 1/2 or one tablet as needed for pain.) for 7 days. 60 tablet 0    Mometasone Furoate (NASONEX NA) 2 sprays by Nasal route 2 times daily. 2 sprays each nare bid       Roflumilast (DALIRESP) 500 MCG tablet Take 500 mcg by mouth daily.  azelastine (ASTELIN) 137 MCG/SPRAY nasal spray 2 sprays by Nasal route 2 times daily.  2 sprays each nare bid       budesonide-formoterol (SYMBICORT) 160-4.5 MCG/ACT AERO Inhale 2 puffs into the lungs 2 times daily.  Levalbuterol HCl (XOPENEX IN) Inhale 2 puffs into the lungs 2 times daily.  cetirizine (ZYRTEC) 10 MG tablet Take 10 mg by mouth daily as needed.  simvastatin (ZOCOR) 40 MG tablet Take 40 mg by mouth nightly.  lisinopril-hydrochlorothiazide (PRINZIDE;ZESTORETIC) 20-12.5 MG per tablet Take 1 tablet by mouth daily.  omeprazole (PRILOSEC) 40 MG capsule Take 40 mg by mouth as needed.  amitriptyline (ELAVIL) 10 MG tablet Take 10 mg by mouth nightly.  duloxetine (CYMBALTA) 60 MG capsule Take 60 mg by mouth Daily with supper.  pregabalin (LYRICA) 75 MG capsule Take 100 mg by mouth 3 times daily as needed.  morphine (GABE) 30 MG SR capsule Take 30 mg by mouth 2 times daily.  estrogens conjugated, synthetic B, (ENJUVIA) 1.25 MG tablet Take 1.25 mg by mouth nightly.  nateglinide (STARLIX) 120 MG tablet Take 60 mg by mouth.  nateglinide (STARLIX) 120 MG tablet Take 120 mg by mouth. 2 tabs with dinner      sitagliptan (JANUVIA) 100 MG tablet Take 100 mg by mouth daily.  fish oil-omega-3 fatty acids 1000 MG capsule Take 2 g by mouth daily.  Glucos-MSM-C-Wr-Njxphe-Qmmiww (GLUCOSAMINE MSM COMPLEX PO) Take 3 tablets by mouth daily. Each tab 500/500 mg       Multiple Vitamin (MULTIVITAMIN PO) Take  by mouth daily.  Ascorbic Acid (VITAMIN C) 500 MG tablet Take 500 mg by mouth daily.  VITAMIN D, CHOLECALCIFEROL, PO Take 2,000 Int'l Units by mouth daily. No current facility-administered medications for this visit.         Allergies   Allergen Reactions    Latex Hives     Blisters and  hives w/gloves,   pantyhose, bandaids, balloons    Accolate [Zafirlukast] Hives, Shortness Of Breath and Rash     wheezing    Avelox [Moxifloxacin Hcl In Nacl] Shortness Of Breath and Rash     wheezing    Ceclor [Cefaclor] Anaphylaxis    Cipro Xr      Rash over body, Severe abdominal pain    Levofloxacin Shortness Of Breath     Wheezing      Adhesive Tape      Blisters, skin peels    Demerol      migraine    Detrol [Tolterodine Tartrate]      Severe abdominal pain    Ditropan [Oxybutynin Chloride]      Severe abdominal pain    Macrobid [Nitrofurantoin Monohydrate Macrocrystals]      Flu-like sx, severe abdominal pain    Other      Scent of Waterless hand wash caused nasal  Mucosa swelling    Prochlorperazine Edisylate      Stiffness and contracture of facial muscles    Vesicare [Solifenacin Succinate]      Severe abdominal pain    Betadine [Povidone Iodine] Hives and Rash    Neurontin [Gabapentin] Rash     GI distress    Nsaids Itching     GI distress       Vital signs:  /78   Pulse 100   Ht 5' 2\" (1.575 m)   Wt 236 lb (107 kg)   BMI 43.16 kg/m²        Neuro: Alert & oriented x 3,  normal,  no focal deficits noted. Normal affect. Eyes: sclera clear  Ears: Normal external ear  Mouth:  No perioral lesions  Pulm: Respirations unlabored and regular  Pulse: Regular rate and rhythm   Skin: Warm, well perfused               Right Hip Examination:    Inspection:  No erythema swelling or signs of infection. Leg lengths symmetric. Palpation: Marked tenderness over her lateral hip/greater trochanter. No palpable masses. She does also have tenderness over the right SI joint posteriorly. .    Range of Motion: No pain with internal/external rotation. Full pain-free range of motion. Strength: Week hip abduction    Special Tests:  Negative Trendelenburg sign. Stability: No subluxation. Skin: There are no rashes, ulcerations or lesions. Gait: Antalgic    Vascular: Skin warm dry and well perfused. No calf tenderness. Neurologic: No focal motor deficits. Sensation intact. Left Hip Examination:    Inspection:  No erythema swelling or signs of infection. Leg lengths symmetric. Palpation: No tenderness. No palpable masses.   .    Range of Motion: Full pain-free

## 2020-04-15 PROBLEM — G90.522 COMPLEX REGIONAL PAIN SYNDROME TYPE 1 OF LEFT LOWER EXTREMITY: Status: ACTIVE | Noted: 2020-04-15

## 2021-02-11 PROBLEM — G90.521 COMPLEX REGIONAL PAIN SYNDROME TYPE 1 OF RIGHT LOWER EXTREMITY: Status: ACTIVE | Noted: 2021-02-11

## 2021-05-06 ENCOUNTER — HOSPITAL ENCOUNTER (OUTPATIENT)
Age: 61
Setting detail: OUTPATIENT SURGERY
Discharge: HOME | End: 2021-05-06
Attending: PAIN MEDICINE | Admitting: PAIN MEDICINE
Payer: MEDICARE

## 2021-05-06 VITALS
SYSTOLIC BLOOD PRESSURE: 152 MMHG | WEIGHT: 230 LBS | HEART RATE: 90 BPM | RESPIRATION RATE: 16 BRPM | DIASTOLIC BLOOD PRESSURE: 85 MMHG | TEMPERATURE: 97.3 F | HEIGHT: 63 IN | BODY MASS INDEX: 40.75 KG/M2 | OXYGEN SATURATION: 95 %

## 2021-05-06 LAB
GLUCOSE BLD-MCNC: 120 MG/DL (ref 70–99)
PERFORMED ON: ABNORMAL

## 2021-05-06 PROCEDURE — 64520 N BLOCK LUMBAR/THORACIC: CPT | Performed by: PAIN MEDICINE

## 2021-05-06 PROCEDURE — 3600000002 HC SURGERY LEVEL 2 BASE: Performed by: PAIN MEDICINE

## 2021-05-06 PROCEDURE — 2500000003 HC RX 250 WO HCPCS: Performed by: PAIN MEDICINE

## 2021-05-06 PROCEDURE — 7100000010 HC PHASE II RECOVERY - FIRST 15 MIN: Performed by: PAIN MEDICINE

## 2021-05-06 PROCEDURE — 6360000004 HC RX CONTRAST MEDICATION: Performed by: PAIN MEDICINE

## 2021-05-06 PROCEDURE — 2709999900 HC NON-CHARGEABLE SUPPLY: Performed by: PAIN MEDICINE

## 2021-05-06 RX ORDER — BUPIVACAINE HYDROCHLORIDE 7.5 MG/ML
INJECTION, SOLUTION EPIDURAL; RETROBULBAR
Status: DISCONTINUED
Start: 2021-05-06 | End: 2021-05-06 | Stop reason: HOSPADM

## 2021-05-06 RX ORDER — LIDOCAINE HYDROCHLORIDE 10 MG/ML
INJECTION, SOLUTION EPIDURAL; INFILTRATION; INTRACAUDAL; PERINEURAL PRN
Status: DISCONTINUED | OUTPATIENT
Start: 2021-05-06 | End: 2021-05-06 | Stop reason: ALTCHOICE

## 2021-05-06 RX ORDER — BUPIVACAINE HYDROCHLORIDE 7.5 MG/ML
INJECTION, SOLUTION EPIDURAL; RETROBULBAR PRN
Status: DISCONTINUED | OUTPATIENT
Start: 2021-05-06 | End: 2021-05-06 | Stop reason: ALTCHOICE

## 2021-05-06 ASSESSMENT — PAIN - FUNCTIONAL ASSESSMENT: PAIN_FUNCTIONAL_ASSESSMENT: 0-10

## 2021-05-06 ASSESSMENT — PAIN SCALES - GENERAL
PAINLEVEL_OUTOF10: 7
PAINLEVEL_OUTOF10: 9

## 2021-05-06 ASSESSMENT — PAIN DESCRIPTION - PAIN TYPE: TYPE: CHRONIC PAIN

## 2021-05-06 ASSESSMENT — PAIN DESCRIPTION - PROGRESSION: CLINICAL_PROGRESSION: GRADUALLY IMPROVING

## 2021-05-06 NOTE — PROGRESS NOTES
Discharge instructions reviewed with pt- states understanding. States ready for discharge. Ambulates to lobby without difficulty.

## 2021-05-06 NOTE — PROCEDURES
Korey Tellez is a 62 y.o. female patient. No diagnosis found. Past Medical History:   Diagnosis Date    Anxiety     Arthritis     knees, shoulders    Asthma     Bladder spasms     has spinal cord stimulator    COPD (chronic obstructive pulmonary disease) (HCC)     Depression     Diabetes mellitus (HCC)     Fibromyalgia     Hyperlipidemia     Hypertension     Migraine     MVP (mitral valve prolapse)     RSD (reflex sympathetic dystrophy)     Sleep apnea     bipap 17/13  is in car     Blood pressure (!) 153/81, pulse 100, temperature 97.3 °F (36.3 °C), temperature source Temporal, resp. rate 18, height 5' 3\" (1.6 m), weight 230 lb (104.3 kg), SpO2 93 %. Procedures    Odilon Lang MD  5/6/2021  INDICATIONS:  Reflex Sympathetic Dystrophy 337.22, G90.521, G90.522  OPERATIVE PROCEDURE: LUMBAR SYMPATHETIC BLOCK AT L2,L3 LEVEL ON THE R SIDE  01800 with [id number]  Consent was signed by the patient after the risks and benefits were explained. With the patient in the prone position, the back was prepped with Prevail and draped. Aseptic technique was used at every stage of the procedure. Skin infiltration was with 0.3 cc of 1% Lidocaine using a 30-gauge needle followed by placement of a _22__-gauge _7__-inch needle using oblique fluoroscopic guidance so that the facet joint was approximately in the middle of the disc space. The needle was placed at the inferior border of the vertebral body at __L2___ level. The needle was adjusted so that in the AP view, it was at the level of the facet column and in the lateral view it was just posterior to the anterior border of the vertebral body. Aspiration was negative for CSF or blood . Injection of Omnipaque dye showed appropriate spread in the front of the vertebral body in the lateral view and within the vertebral body complex in the AP view.  ___3____ cc of   0.75% Marcaine was injected and the needle was pulled out intact.      ESTIMATED BLOOD LOSS: Less than 1 cc      Pulse Ox Monitoring was done.                 ________________________________                   Jered Boyd M.D.

## 2021-05-06 NOTE — PROGRESS NOTES
Patient  able to demonstrate the ability to move from a reclining position to an upright position within the recliner.

## 2021-12-28 PROBLEM — M51.36 DEGENERATION OF LUMBAR INTERVERTEBRAL DISC: Status: ACTIVE | Noted: 2021-12-28

## (undated) DEVICE — STERILE POLYISOPRENE POWDER-FREE SURGICAL GLOVES: Brand: PROTEXIS

## (undated) DEVICE — GAUZE,SPONGE,4"X4",16PLY,STRL,LF,10/TRAY: Brand: MEDLINE

## (undated) DEVICE — APPLICATOR PREP 26ML 0.7% IOD POVACRYLEX 74% ISO ALC ST

## (undated) DEVICE — TOWEL OR BLUEE 16X26IN ST 8 PACK ORB08 16X26ORTWL

## (undated) DEVICE — UNIVERSAL BLOCK TRAY: Brand: MEDLINE INDUSTRIES, INC.

## (undated) DEVICE — ALCOHOL RUBBING 16OZ 70% ISO